# Patient Record
Sex: FEMALE | Race: BLACK OR AFRICAN AMERICAN | NOT HISPANIC OR LATINO | Employment: FULL TIME | URBAN - METROPOLITAN AREA
[De-identification: names, ages, dates, MRNs, and addresses within clinical notes are randomized per-mention and may not be internally consistent; named-entity substitution may affect disease eponyms.]

---

## 2023-02-05 PROBLEM — H26.9 CATARACTA: Status: ACTIVE | Noted: 2023-02-05

## 2023-02-05 PROBLEM — M94.0 COSTOCHONDRITIS: Status: ACTIVE | Noted: 2023-02-05

## 2023-02-05 PROBLEM — H52.209 MYOPIA WITH ASTIGMATISM AND PRESBYOPIA: Status: ACTIVE | Noted: 2023-02-05

## 2023-02-05 PROBLEM — R31.0 GROSS HEMATURIA: Status: ACTIVE | Noted: 2023-02-05

## 2023-02-05 PROBLEM — E11.9 DIABETES MELLITUS (MULTI): Status: RESOLVED | Noted: 2023-02-05 | Resolved: 2023-02-05

## 2023-02-05 PROBLEM — E66.812 CLASS 2 SEVERE OBESITY WITH BODY MASS INDEX (BMI) OF 35 TO 39.9 WITH SERIOUS COMORBIDITY: Status: ACTIVE | Noted: 2023-02-05

## 2023-02-05 PROBLEM — M25.50 ARTHRALGIA: Status: ACTIVE | Noted: 2023-02-05

## 2023-02-05 PROBLEM — H25.812 COMBINED FORM OF AGE-RELATED CATARACT, LEFT EYE: Status: ACTIVE | Noted: 2023-02-05

## 2023-02-05 PROBLEM — H25.811 COMBINED FORM OF AGE-RELATED CATARACT, RIGHT EYE: Status: ACTIVE | Noted: 2023-02-05

## 2023-02-05 PROBLEM — N39.0 ACUTE UTI: Status: RESOLVED | Noted: 2023-02-05 | Resolved: 2023-02-05

## 2023-02-05 PROBLEM — M43.10 ACQUIRED SPONDYLOLISTHESIS: Status: ACTIVE | Noted: 2023-02-05

## 2023-02-05 PROBLEM — Z96.1 PSEUDOPHAKIA OF RIGHT EYE: Status: ACTIVE | Noted: 2023-02-05

## 2023-02-05 PROBLEM — M72.2 PLANTAR FASCIITIS: Status: ACTIVE | Noted: 2023-02-05

## 2023-02-05 PROBLEM — N76.0 BACTERIAL VAGINOSIS: Status: RESOLVED | Noted: 2023-02-05 | Resolved: 2023-02-05

## 2023-02-05 PROBLEM — H35.411 LATTICE DEGENERATION OF PERIPHERAL RETINA, RIGHT: Status: ACTIVE | Noted: 2023-02-05

## 2023-02-05 PROBLEM — E11.9 CONTROLLED DIABETES MELLITUS TYPE II WITHOUT COMPLICATION (MULTI): Status: ACTIVE | Noted: 2023-02-05

## 2023-02-05 PROBLEM — G47.33 OSA (OBSTRUCTIVE SLEEP APNEA): Status: ACTIVE | Noted: 2023-02-05

## 2023-02-05 PROBLEM — H52.4 MYOPIA WITH ASTIGMATISM AND PRESBYOPIA: Status: ACTIVE | Noted: 2023-02-05

## 2023-02-05 PROBLEM — J01.00 ACUTE MAXILLARY SINUSITIS: Status: RESOLVED | Noted: 2023-02-05 | Resolved: 2023-02-05

## 2023-02-05 PROBLEM — E55.9 VITAMIN D DEFICIENCY: Status: ACTIVE | Noted: 2023-02-05

## 2023-02-05 PROBLEM — M54.16 LUMBAR RADICULOPATHY: Status: ACTIVE | Noted: 2023-02-05

## 2023-02-05 PROBLEM — H04.123 DRY EYES, BILATERAL: Status: ACTIVE | Noted: 2023-02-05

## 2023-02-05 PROBLEM — R06.83 SNORING: Status: ACTIVE | Noted: 2023-02-05

## 2023-02-05 PROBLEM — A59.01 TRICHOMONAS VAGINITIS: Status: RESOLVED | Noted: 2023-02-05 | Resolved: 2023-02-05

## 2023-02-05 PROBLEM — N89.8 VAGINA ITCHING: Status: RESOLVED | Noted: 2023-02-05 | Resolved: 2023-02-05

## 2023-02-05 PROBLEM — B96.89 BACTERIAL VAGINOSIS: Status: RESOLVED | Noted: 2023-02-05 | Resolved: 2023-02-05

## 2023-02-05 PROBLEM — R31.9 HEMATURIA: Status: ACTIVE | Noted: 2023-02-05

## 2023-02-05 PROBLEM — B37.31 VAGINAL YEAST INFECTION: Status: RESOLVED | Noted: 2023-02-05 | Resolved: 2023-02-05

## 2023-02-05 PROBLEM — E66.01 CLASS 2 SEVERE OBESITY WITH BODY MASS INDEX (BMI) OF 35 TO 39.9 WITH SERIOUS COMORBIDITY (MULTI): Status: ACTIVE | Noted: 2023-02-05

## 2023-02-05 PROBLEM — E78.5 HYPERLIPIDEMIA: Status: ACTIVE | Noted: 2023-02-05

## 2023-02-05 PROBLEM — R61 DIAPHORESIS: Status: ACTIVE | Noted: 2023-02-05

## 2023-02-05 PROBLEM — J30.9 ALLERGIC RHINITIS: Status: ACTIVE | Noted: 2023-02-05

## 2023-02-05 PROBLEM — R82.90 ABNORMAL FINDING IN URINE: Status: RESOLVED | Noted: 2023-02-05 | Resolved: 2023-02-05

## 2023-02-05 PROBLEM — H52.10 MYOPIA WITH ASTIGMATISM AND PRESBYOPIA: Status: ACTIVE | Noted: 2023-02-05

## 2023-02-05 PROBLEM — I10 HYPERTENSION: Status: ACTIVE | Noted: 2023-02-05

## 2023-02-05 PROBLEM — E66.9 OBESITY: Status: ACTIVE | Noted: 2023-02-05

## 2023-02-05 PROBLEM — E88.810 METABOLIC SYNDROME: Status: ACTIVE | Noted: 2023-02-05

## 2023-02-05 PROBLEM — H53.8 BLURRED VISION: Status: ACTIVE | Noted: 2023-02-05

## 2023-02-05 PROBLEM — J18.9 CAP (COMMUNITY ACQUIRED PNEUMONIA): Status: ACTIVE | Noted: 2023-02-05

## 2023-02-05 PROBLEM — R74.8 ELEVATED LIVER ENZYMES: Status: ACTIVE | Noted: 2023-02-05

## 2023-02-05 PROBLEM — G47.00 INSOMNIA: Status: ACTIVE | Noted: 2023-02-05

## 2023-02-05 PROBLEM — M54.50 LOW BACK PAIN: Status: ACTIVE | Noted: 2023-02-05

## 2023-02-05 RX ORDER — MONTELUKAST SODIUM 10 MG/1
TABLET ORAL
COMMUNITY
End: 2023-08-17 | Stop reason: SDDI

## 2023-02-05 RX ORDER — ALOGLIPTIN 25 MG/1
TABLET, FILM COATED ORAL
COMMUNITY
End: 2023-04-27

## 2023-02-05 RX ORDER — ESTRADIOL 0.1 MG/G
CREAM VAGINAL
COMMUNITY

## 2023-02-05 RX ORDER — SALIVA STIMULANT COMB. NO.3
SPRAY, NON-AEROSOL (ML) MUCOUS MEMBRANE
COMMUNITY

## 2023-02-05 RX ORDER — TALC
POWDER (GRAM) TOPICAL
COMMUNITY

## 2023-02-05 RX ORDER — GABAPENTIN 600 MG/1
TABLET ORAL
COMMUNITY
End: 2023-10-16 | Stop reason: SDUPTHER

## 2023-02-05 RX ORDER — CARVEDILOL 12.5 MG/1
TABLET ORAL
COMMUNITY
End: 2023-12-08 | Stop reason: SDUPTHER

## 2023-02-05 RX ORDER — MINERAL OIL
ENEMA (ML) RECTAL
COMMUNITY
End: 2024-05-13

## 2023-02-05 RX ORDER — IBUPROFEN 600 MG/1
TABLET ORAL
COMMUNITY
End: 2023-03-29 | Stop reason: SDUPTHER

## 2023-02-05 RX ORDER — SIMVASTATIN 40 MG/1
TABLET, FILM COATED ORAL
COMMUNITY
End: 2023-10-03 | Stop reason: SDUPTHER

## 2023-02-05 RX ORDER — POTASSIUM CHLORIDE 750 MG/1
TABLET, FILM COATED, EXTENDED RELEASE ORAL
COMMUNITY
End: 2023-09-01 | Stop reason: SDUPTHER

## 2023-02-05 RX ORDER — METFORMIN HYDROCHLORIDE 500 MG/1
500 TABLET, EXTENDED RELEASE ORAL
COMMUNITY

## 2023-02-05 RX ORDER — LOSARTAN POTASSIUM AND HYDROCHLOROTHIAZIDE 12.5; 1 MG/1; MG/1
TABLET ORAL
COMMUNITY
End: 2023-06-29

## 2023-02-05 RX ORDER — FLUTICASONE PROPIONATE 50 MCG
SPRAY, SUSPENSION (ML) NASAL
COMMUNITY
End: 2024-05-15

## 2023-02-15 RX ORDER — BLOOD SUGAR DIAGNOSTIC
STRIP MISCELLANEOUS
COMMUNITY
Start: 2021-08-23 | End: 2023-08-23

## 2023-03-29 DIAGNOSIS — J30.9 ALLERGIC RHINITIS, UNSPECIFIED SEASONALITY, UNSPECIFIED TRIGGER: ICD-10-CM

## 2023-03-30 RX ORDER — IBUPROFEN 600 MG/1
600 TABLET ORAL 3 TIMES DAILY
Qty: 90 TABLET | Refills: 0 | Status: SHIPPED | OUTPATIENT
Start: 2023-03-30 | End: 2023-04-30

## 2023-04-20 ENCOUNTER — OFFICE VISIT (OUTPATIENT)
Dept: PRIMARY CARE | Facility: CLINIC | Age: 63
End: 2023-04-20
Payer: MEDICAID

## 2023-04-20 VITALS
BODY MASS INDEX: 34.31 KG/M2 | HEIGHT: 64 IN | SYSTOLIC BLOOD PRESSURE: 119 MMHG | WEIGHT: 201 LBS | DIASTOLIC BLOOD PRESSURE: 88 MMHG

## 2023-04-20 DIAGNOSIS — E78.00 PURE HYPERCHOLESTEROLEMIA: ICD-10-CM

## 2023-04-20 DIAGNOSIS — Z12.11 SCREEN FOR COLON CANCER: ICD-10-CM

## 2023-04-20 DIAGNOSIS — E11.9 CONTROLLED TYPE 2 DIABETES MELLITUS WITHOUT COMPLICATION, WITHOUT LONG-TERM CURRENT USE OF INSULIN (MULTI): Primary | ICD-10-CM

## 2023-04-20 DIAGNOSIS — I10 PRIMARY HYPERTENSION: ICD-10-CM

## 2023-04-20 DIAGNOSIS — G47.33 OSA (OBSTRUCTIVE SLEEP APNEA): ICD-10-CM

## 2023-04-20 DIAGNOSIS — Z00.00 PREVENTATIVE HEALTH CARE: ICD-10-CM

## 2023-04-20 DIAGNOSIS — R21 RASH: ICD-10-CM

## 2023-04-20 PROCEDURE — 3074F SYST BP LT 130 MM HG: CPT | Performed by: FAMILY MEDICINE

## 2023-04-20 PROCEDURE — 1036F TOBACCO NON-USER: CPT | Performed by: FAMILY MEDICINE

## 2023-04-20 PROCEDURE — 3079F DIAST BP 80-89 MM HG: CPT | Performed by: FAMILY MEDICINE

## 2023-04-20 PROCEDURE — 99214 OFFICE O/P EST MOD 30 MIN: CPT | Performed by: FAMILY MEDICINE

## 2023-04-20 RX ORDER — CLOTRIMAZOLE AND BETAMETHASONE DIPROPIONATE 10; .64 MG/G; MG/G
1 CREAM TOPICAL 2 TIMES DAILY
Qty: 30 G | Refills: 0 | Status: SHIPPED | OUTPATIENT
Start: 2023-04-20 | End: 2023-05-20

## 2023-04-20 ASSESSMENT — PATIENT HEALTH QUESTIONNAIRE - PHQ9
SUM OF ALL RESPONSES TO PHQ9 QUESTIONS 1 AND 2: 0
1. LITTLE INTEREST OR PLEASURE IN DOING THINGS: NOT AT ALL
2. FEELING DOWN, DEPRESSED OR HOPELESS: NOT AT ALL

## 2023-04-22 PROBLEM — R21 RASH: Status: ACTIVE | Noted: 2023-04-22

## 2023-04-22 PROBLEM — Z12.11 SCREEN FOR COLON CANCER: Status: ACTIVE | Noted: 2023-04-22

## 2023-04-22 PROBLEM — Z00.00 PREVENTATIVE HEALTH CARE: Status: ACTIVE | Noted: 2023-04-22

## 2023-04-22 PROBLEM — E66.01 CLASS 2 SEVERE OBESITY WITH BODY MASS INDEX (BMI) OF 35 TO 39.9 WITH SERIOUS COMORBIDITY (MULTI): Status: RESOLVED | Noted: 2023-02-05 | Resolved: 2023-04-22

## 2023-04-22 PROBLEM — E66.812 CLASS 2 SEVERE OBESITY WITH BODY MASS INDEX (BMI) OF 35 TO 39.9 WITH SERIOUS COMORBIDITY: Status: RESOLVED | Noted: 2023-02-05 | Resolved: 2023-04-22

## 2023-04-22 ASSESSMENT — ENCOUNTER SYMPTOMS
ALLERGIC/IMMUNOLOGIC NEGATIVE: 1
GASTROINTESTINAL NEGATIVE: 1
NAUSEA: 0
HEMATOLOGIC/LYMPHATIC NEGATIVE: 1
MUSCULOSKELETAL NEGATIVE: 1
RESPIRATORY NEGATIVE: 1
FEVER: 0
ENDOCRINE NEGATIVE: 1
NEUROLOGICAL NEGATIVE: 1
CHILLS: 0
CARDIOVASCULAR NEGATIVE: 1
PSYCHIATRIC NEGATIVE: 1
VOMITING: 0

## 2023-04-22 NOTE — ASSESSMENT & PLAN NOTE
Check lipid panel continue medications continue healthy eating work out  150 minutes a week    
Education provided re: diabetes pathophysiology and management, including effectiveness of diet, exercise, and medication in controlling sugar and preventing complications, as well as information on the complications and recommended care. I want your fasting morning blood sugar to be less than 100.   I want your sugar to be less than 140 two hours after a meal.   
Patient's blood pressure is at goal of 130/85 or less. Condition is stable. Continue current medications and treatment plan.  I recommend that you exercise for 30-45 minutes 5 days a week.  I also recommend a balanced diet with fruits and vegetables every day, lean meats, and little fried foods. The DASH diet (you can find this online) is a good example of this.   
Prescription given for shingles vaccine patient is also due for colonoscopy  
No

## 2023-04-22 NOTE — PROGRESS NOTES
Subjective   Patient ID: Heaven Alfaro is a 62 y.o. female who presents for Follow-up (6 month) and Rash (Arm blotchy patches).      Here for follow-up of hypertension hyperlipidemia diabetes type 2 sees endocrinology diabetes being managed by endocrinology has been compliant with CPAP complaining of her rash that has been itchy thinks it was [place]    Rash  Pertinent negatives include no fever or vomiting.     Current Outpatient Medications on File Prior to Visit   Medication Sig Dispense Refill    alogliptin (Nesina) 25 mg tablet TAKE 1 TABLET BY MOUTH ONCE DAILY      carvedilol (Coreg) 12.5 mg tablet TAKE 1 TABLET TWICE DAILY WITH MEALS.      estradiol (Estrace) 0.01 % (0.1 mg/gram) vaginal cream APPLY A PEA-SIZED AMOUNT TO urethral and vagina EVERY EVENING FOR TWO WEEKS, THEN DECREASE TO EVERY MONDAY, WEDNESDAY AND FRIDAY THEREAFTER      fexofenadine (Allegra) 180 mg tablet TAKE 1 TABLET DAILY AS NEEDED      fluticasone (Flonase) 50 mcg/actuation nasal spray INSTILL 2 SPRAYS INTO EACH NOSTRIL EVERY DAY      gabapentin (Neurontin) 600 mg tablet TAKE 1 TABLET BY MOUTH THREE TIMES DAILY      ibuprofen 600 mg tablet Take 1 tablet (600 mg) by mouth in the morning and 1 tablet (600 mg) in the evening and 1 tablet (600 mg) before bedtime. 90 tablet 0    losartan-hydrochlorothiazide (Hyzaar) 100-12.5 mg tablet TAKE 1 TABLET DAILY.      melatonin 3 mg tablet TAKE 1 TABLET BY MOUTH AT BEDTIME AS NEEDED      metFORMIN XR (Glucophage-XR) 500 mg 24 hr tablet TAKE 4 TABLET Daily      moisturizing mouth (Biotene Moisturizing Mouth) solution USE AS DIRECTED AS A SALIVA SUBSTITUTE      montelukast (Singulair) 10 mg tablet TAKE 1 TABLET AT BEDTIME.      OneTouch Ultra Test strip USE 1 STRIP Daily      potassium chloride CR (Klor-Con) 10 mEq ER tablet TAKE 1 TABLET BY MOUTH DAILY      simvastatin (Zocor) 40 mg tablet TAKE ONE TABLET BY MOUTH EVERY DAY      [DISCONTINUED] empagliflozin  "(Jardiance) 10 mg Take 1 tablet (10 mg) by mouth once daily.       No current facility-administered medications on file prior to visit.        Review of Systems   Constitutional:  Negative for chills and fever.   HENT: Negative.     Respiratory: Negative.     Cardiovascular: Negative.    Gastrointestinal: Negative.  Negative for nausea and vomiting.   Endocrine: Negative.    Genitourinary: Negative.    Musculoskeletal: Negative.    Skin:  Positive for rash.   Allergic/Immunologic: Negative.    Neurological: Negative.    Hematological: Negative.    Psychiatric/Behavioral: Negative.     All other systems reviewed and are negative.      Objective   Blood Pressure 119/88   Height 1.626 m (5' 4\")   Weight 91.2 kg (201 lb)   Body Mass Index 34.50 kg/m²   BSA: 2.03 meters squared  Growth percentiles: Facility age limit for growth %cesia is 20 years. Facility age limit for growth %cesia is 20 years.   No visits with results within 1 Week(s) from this visit.   Latest known visit with results is:   Legacy Encounter on 03/07/2023   Component Date Value Ref Range Status    Ventricular Rate 03/07/2023 76  BPM Final    Atrial Rate 03/07/2023 76  BPM Final    UT Interval 03/07/2023 214  ms Final    QRS Duration 03/07/2023 82  ms Final    QT Interval 03/07/2023 378  ms Final    QTC Calculation(Bazett) 03/07/2023 425  ms Final    P Axis 03/07/2023 53  degrees Final    R Axis 03/07/2023 59  degrees Final    T Hominy 03/07/2023 53  degrees Final    QRS Count 03/07/2023 13  beats Final    Q Onset 03/07/2023 218  ms Final    P Onset 03/07/2023 111  ms Final    P Offset 03/07/2023 165  ms Final    T Offset 03/07/2023 407  ms Final    QTC Fredericia 03/07/2023 408  ms Final      Physical Exam  Vitals and nursing note reviewed.   Constitutional:       Appearance: Normal appearance.   HENT:      Head: Normocephalic and atraumatic.      Right Ear: Tympanic membrane normal.      Left Ear: Tympanic membrane normal.      Nose: Nose normal.      " Mouth/Throat:      Mouth: Mucous membranes are moist.   Eyes:      Pupils: Pupils are equal, round, and reactive to light.   Cardiovascular:      Rate and Rhythm: Normal rate and regular rhythm.      Pulses: Normal pulses.      Heart sounds: Normal heart sounds.   Pulmonary:      Effort: Pulmonary effort is normal.      Breath sounds: Normal breath sounds.   Abdominal:      General: Abdomen is flat. Bowel sounds are normal.      Palpations: Abdomen is soft.   Musculoskeletal:         General: Normal range of motion.      Cervical back: Normal range of motion and neck supple.   Skin:     General: Skin is warm and dry.      Capillary Refill: Capillary refill takes less than 2 seconds.      Findings: Rash present. Rash is macular.   Neurological:      General: No focal deficit present.      Mental Status: She is alert and oriented to person, place, and time.   Psychiatric:         Mood and Affect: Mood normal.         Assessment/Plan   Problem List Items Addressed This Visit       Controlled diabetes mellitus type II without complication (CMS/HCC) - Primary     Education provided re: diabetes pathophysiology and management, including effectiveness of diet, exercise, and medication in controlling sugar and preventing complications, as well as information on the complications and recommended care. I want your fasting morning blood sugar to be less than 100.   I want your sugar to be less than 140 two hours after a meal.          Hyperlipidemia     Check lipid panel continue medications continue healthy eating work out  150 minutes a week           Hypertension     Patient's blood pressure is at goal of 130/85 or less. Condition is stable. Continue current medications and treatment plan.  I recommend that you exercise for 30-45 minutes 5 days a week.  I also recommend a balanced diet with fruits and vegetables every day, lean meats, and little fried foods. The DASH diet (you can find this online) is a good example of  this.          MOLLY (obstructive sleep apnea)    Rash    Relevant Medications    clotrimazole-betamethasone (Lotrisone) cream    Preventative health care     Prescription given for shingles vaccine patient is also due for colonoscopy

## 2023-04-27 DIAGNOSIS — E11.9 CONTROLLED TYPE 2 DIABETES MELLITUS WITHOUT COMPLICATION, WITHOUT LONG-TERM CURRENT USE OF INSULIN (MULTI): ICD-10-CM

## 2023-04-27 RX ORDER — ALOGLIPTIN 25 MG/1
25 TABLET, FILM COATED ORAL DAILY
Qty: 90 TABLET | Refills: 1 | Status: SHIPPED | OUTPATIENT
Start: 2023-04-27 | End: 2023-09-01 | Stop reason: SDUPTHER

## 2023-04-30 DIAGNOSIS — J30.9 ALLERGIC RHINITIS, UNSPECIFIED SEASONALITY, UNSPECIFIED TRIGGER: ICD-10-CM

## 2023-04-30 RX ORDER — IBUPROFEN 600 MG/1
TABLET ORAL
Qty: 90 TABLET | Refills: 0 | Status: SHIPPED | OUTPATIENT
Start: 2023-04-30 | End: 2023-06-22 | Stop reason: SDUPTHER

## 2023-06-22 DIAGNOSIS — J30.9 ALLERGIC RHINITIS, UNSPECIFIED SEASONALITY, UNSPECIFIED TRIGGER: ICD-10-CM

## 2023-06-22 RX ORDER — IBUPROFEN 600 MG/1
TABLET ORAL
Qty: 90 TABLET | Refills: 1 | Status: SHIPPED | OUTPATIENT
Start: 2023-06-22 | End: 2023-09-12

## 2023-06-29 ENCOUNTER — TELEPHONE (OUTPATIENT)
Dept: PRIMARY CARE | Facility: CLINIC | Age: 63
End: 2023-06-29
Payer: MEDICAID

## 2023-06-29 DIAGNOSIS — I10 PRIMARY HYPERTENSION: ICD-10-CM

## 2023-06-29 RX ORDER — LOSARTAN POTASSIUM AND HYDROCHLOROTHIAZIDE 12.5; 1 MG/1; MG/1
1 TABLET ORAL DAILY
Qty: 90 TABLET | Refills: 1 | Status: SHIPPED | OUTPATIENT
Start: 2023-06-29 | End: 2023-07-23

## 2023-06-29 NOTE — TELEPHONE ENCOUNTER
Pt says yes she needs a letter to clear her to work due to her bp  The emial letter to [email or web address]

## 2023-07-23 DIAGNOSIS — I10 PRIMARY HYPERTENSION: ICD-10-CM

## 2023-07-23 RX ORDER — LOSARTAN POTASSIUM AND HYDROCHLOROTHIAZIDE 12.5; 1 MG/1; MG/1
1 TABLET ORAL DAILY
Qty: 90 TABLET | Refills: 1 | Status: SHIPPED | OUTPATIENT
Start: 2023-07-23 | End: 2023-07-24 | Stop reason: SDUPTHER

## 2023-07-24 DIAGNOSIS — I10 PRIMARY HYPERTENSION: ICD-10-CM

## 2023-07-24 RX ORDER — LOSARTAN POTASSIUM AND HYDROCHLOROTHIAZIDE 12.5; 1 MG/1; MG/1
1 TABLET ORAL DAILY
Qty: 90 TABLET | Refills: 1 | Status: SHIPPED | OUTPATIENT
Start: 2023-07-24 | End: 2023-07-25 | Stop reason: SDUPTHER

## 2023-07-25 DIAGNOSIS — I10 PRIMARY HYPERTENSION: ICD-10-CM

## 2023-07-25 RX ORDER — LOSARTAN POTASSIUM AND HYDROCHLOROTHIAZIDE 12.5; 1 MG/1; MG/1
1 TABLET ORAL DAILY
Qty: 90 TABLET | Refills: 1 | Status: SHIPPED | OUTPATIENT
Start: 2023-07-25

## 2023-08-13 DIAGNOSIS — E11.9 CONTROLLED TYPE 2 DIABETES MELLITUS WITHOUT COMPLICATION, WITHOUT LONG-TERM CURRENT USE OF INSULIN (MULTI): ICD-10-CM

## 2023-08-13 RX ORDER — BLOOD-GLUCOSE CONTROL, NORMAL
1 EACH MISCELLANEOUS 3 TIMES DAILY
Qty: 100 EACH | Refills: 3 | Status: SHIPPED | OUTPATIENT
Start: 2023-08-13 | End: 2023-09-27 | Stop reason: ALTCHOICE

## 2023-08-17 ENCOUNTER — OFFICE VISIT (OUTPATIENT)
Dept: PRIMARY CARE | Facility: CLINIC | Age: 63
End: 2023-08-17
Payer: MEDICAID

## 2023-08-17 VITALS
HEART RATE: 65 BPM | SYSTOLIC BLOOD PRESSURE: 119 MMHG | WEIGHT: 200 LBS | DIASTOLIC BLOOD PRESSURE: 78 MMHG | BODY MASS INDEX: 34.15 KG/M2 | HEIGHT: 64 IN

## 2023-08-17 DIAGNOSIS — E78.00 PURE HYPERCHOLESTEROLEMIA: ICD-10-CM

## 2023-08-17 DIAGNOSIS — E11.9 CONTROLLED TYPE 2 DIABETES MELLITUS WITHOUT COMPLICATION, WITHOUT LONG-TERM CURRENT USE OF INSULIN (MULTI): Primary | ICD-10-CM

## 2023-08-17 DIAGNOSIS — Z12.31 VISIT FOR SCREENING MAMMOGRAM: ICD-10-CM

## 2023-08-17 DIAGNOSIS — Z00.00 ROUTINE GENERAL MEDICAL EXAMINATION AT A HEALTH CARE FACILITY: ICD-10-CM

## 2023-08-17 DIAGNOSIS — E55.9 VITAMIN D DEFICIENCY: ICD-10-CM

## 2023-08-17 DIAGNOSIS — Z12.11 COLON CANCER SCREENING: ICD-10-CM

## 2023-08-17 DIAGNOSIS — I10 PRIMARY HYPERTENSION: ICD-10-CM

## 2023-08-17 LAB
ALANINE AMINOTRANSFERASE (SGPT) (U/L) IN SER/PLAS: 22 U/L (ref 7–45)
ALBUMIN (G/DL) IN SER/PLAS: 4.3 G/DL (ref 3.4–5)
ALBUMIN (MG/L) IN URINE: 7.2 MG/L
ALBUMIN/CREATININE (UG/MG) IN URINE: 6.9 UG/MG CRT (ref 0–30)
ALKALINE PHOSPHATASE (U/L) IN SER/PLAS: 55 U/L (ref 33–136)
ANION GAP IN SER/PLAS: 15 MMOL/L (ref 10–20)
ASPARTATE AMINOTRANSFERASE (SGOT) (U/L) IN SER/PLAS: 29 U/L (ref 9–39)
BILIRUBIN TOTAL (MG/DL) IN SER/PLAS: 0.8 MG/DL (ref 0–1.2)
CALCIDIOL (25 OH VITAMIN D3) (NG/ML) IN SER/PLAS: 37 NG/ML
CALCIUM (MG/DL) IN SER/PLAS: 9.3 MG/DL (ref 8.6–10.6)
CARBON DIOXIDE, TOTAL (MMOL/L) IN SER/PLAS: 23 MMOL/L (ref 21–32)
CHLORIDE (MMOL/L) IN SER/PLAS: 109 MMOL/L (ref 98–107)
CHOLESTEROL (MG/DL) IN SER/PLAS: 139 MG/DL (ref 0–199)
CHOLESTEROL IN HDL (MG/DL) IN SER/PLAS: 36 MG/DL
CHOLESTEROL/HDL RATIO: 3.9
COBALAMIN (VITAMIN B12) (PG/ML) IN SER/PLAS: >2000 PG/ML (ref 211–911)
CREATININE (MG/DL) IN SER/PLAS: 0.81 MG/DL (ref 0.5–1.05)
CREATININE (MG/DL) IN URINE: 105 MG/DL (ref 20–320)
ERYTHROCYTE DISTRIBUTION WIDTH (RATIO) BY AUTOMATED COUNT: 13.5 % (ref 11.5–14.5)
ERYTHROCYTE MEAN CORPUSCULAR HEMOGLOBIN CONCENTRATION (G/DL) BY AUTOMATED: 31 G/DL (ref 32–36)
ERYTHROCYTE MEAN CORPUSCULAR VOLUME (FL) BY AUTOMATED COUNT: 91 FL (ref 80–100)
ERYTHROCYTES (10*6/UL) IN BLOOD BY AUTOMATED COUNT: 4.4 X10E12/L (ref 4–5.2)
GFR FEMALE: 82 ML/MIN/1.73M2
GLUCOSE (MG/DL) IN SER/PLAS: 102 MG/DL (ref 74–99)
HEMATOCRIT (%) IN BLOOD BY AUTOMATED COUNT: 40 % (ref 36–46)
HEMOGLOBIN (G/DL) IN BLOOD: 12.4 G/DL (ref 12–16)
LDL: 81 MG/DL (ref 0–99)
LEUKOCYTES (10*3/UL) IN BLOOD BY AUTOMATED COUNT: 5.8 X10E9/L (ref 4.4–11.3)
NRBC (PER 100 WBCS) BY AUTOMATED COUNT: 0 /100 WBC (ref 0–0)
PLATELETS (10*3/UL) IN BLOOD AUTOMATED COUNT: 290 X10E9/L (ref 150–450)
POC HEMOGLOBIN A1C: 6.7 % (ref 4.2–6.5)
POTASSIUM (MMOL/L) IN SER/PLAS: 3.5 MMOL/L (ref 3.5–5.3)
PROTEIN TOTAL: 7.1 G/DL (ref 6.4–8.2)
SODIUM (MMOL/L) IN SER/PLAS: 143 MMOL/L (ref 136–145)
THYROTROPIN (MIU/L) IN SER/PLAS BY DETECTION LIMIT <= 0.05 MIU/L: 0.77 MIU/L (ref 0.44–3.98)
TRIGLYCERIDE (MG/DL) IN SER/PLAS: 111 MG/DL (ref 0–149)
UREA NITROGEN (MG/DL) IN SER/PLAS: 20 MG/DL (ref 6–23)
VLDL: 22 MG/DL (ref 0–40)

## 2023-08-17 PROCEDURE — 3074F SYST BP LT 130 MM HG: CPT | Performed by: FAMILY MEDICINE

## 2023-08-17 PROCEDURE — 82607 VITAMIN B-12: CPT

## 2023-08-17 PROCEDURE — 99214 OFFICE O/P EST MOD 30 MIN: CPT | Performed by: FAMILY MEDICINE

## 2023-08-17 PROCEDURE — 82306 VITAMIN D 25 HYDROXY: CPT

## 2023-08-17 PROCEDURE — 80053 COMPREHEN METABOLIC PANEL: CPT

## 2023-08-17 PROCEDURE — 82570 ASSAY OF URINE CREATININE: CPT

## 2023-08-17 PROCEDURE — 3078F DIAST BP <80 MM HG: CPT | Performed by: FAMILY MEDICINE

## 2023-08-17 PROCEDURE — 80061 LIPID PANEL: CPT

## 2023-08-17 PROCEDURE — 84443 ASSAY THYROID STIM HORMONE: CPT

## 2023-08-17 PROCEDURE — 85027 COMPLETE CBC AUTOMATED: CPT

## 2023-08-17 PROCEDURE — 83036 HEMOGLOBIN GLYCOSYLATED A1C: CPT | Performed by: FAMILY MEDICINE

## 2023-08-17 PROCEDURE — 82043 UR ALBUMIN QUANTITATIVE: CPT

## 2023-08-17 PROCEDURE — 1036F TOBACCO NON-USER: CPT | Performed by: FAMILY MEDICINE

## 2023-08-17 RX ORDER — BLOOD-GLUCOSE CONTROL, NORMAL
EACH MISCELLANEOUS
Qty: 100 EACH | Refills: 3 | Status: SHIPPED | OUTPATIENT
Start: 2023-08-17 | End: 2023-09-28 | Stop reason: ALTCHOICE

## 2023-08-17 RX ORDER — PNEUMOCOCCAL 20-VALENT CONJUGATE VACCINE 2.2; 2.2; 2.2; 2.2; 2.2; 2.2; 2.2; 2.2; 2.2; 2.2; 2.2; 2.2; 2.2; 2.2; 2.2; 2.2; 4.4; 2.2; 2.2; 2.2 UG/.5ML; UG/.5ML; UG/.5ML; UG/.5ML; UG/.5ML; UG/.5ML; UG/.5ML; UG/.5ML; UG/.5ML; UG/.5ML; UG/.5ML; UG/.5ML; UG/.5ML; UG/.5ML; UG/.5ML; UG/.5ML; UG/.5ML; UG/.5ML; UG/.5ML; UG/.5ML
0.5 INJECTION, SUSPENSION INTRAMUSCULAR ONCE
Qty: 0.5 ML | Refills: 0 | Status: SHIPPED | OUTPATIENT
Start: 2023-08-17 | End: 2023-08-17

## 2023-08-17 ASSESSMENT — ENCOUNTER SYMPTOMS
DEPRESSION: 0
OCCASIONAL FEELINGS OF UNSTEADINESS: 0
LOSS OF SENSATION IN FEET: 0

## 2023-08-17 ASSESSMENT — ACTIVITIES OF DAILY LIVING (ADL)
GROCERY_SHOPPING: INDEPENDENT
TAKING_MEDICATION: INDEPENDENT
DOING_HOUSEWORK: INDEPENDENT
MANAGING_FINANCES: INDEPENDENT
DRESSING: INDEPENDENT
BATHING: INDEPENDENT

## 2023-08-17 ASSESSMENT — PATIENT HEALTH QUESTIONNAIRE - PHQ9
1. LITTLE INTEREST OR PLEASURE IN DOING THINGS: NOT AT ALL
SUM OF ALL RESPONSES TO PHQ9 QUESTIONS 1 AND 2: 0
2. FEELING DOWN, DEPRESSED OR HOPELESS: NOT AT ALL

## 2023-08-19 PROBLEM — Z12.11 COLON CANCER SCREENING: Status: ACTIVE | Noted: 2023-08-19

## 2023-08-19 PROBLEM — Z12.31 VISIT FOR SCREENING MAMMOGRAM: Status: ACTIVE | Noted: 2023-08-19

## 2023-08-19 PROBLEM — Z00.00 ROUTINE GENERAL MEDICAL EXAMINATION AT A HEALTH CARE FACILITY: Status: ACTIVE | Noted: 2023-08-19

## 2023-08-19 NOTE — PROGRESS NOTES
Assessment and Plan:  Problem List Items Addressed This Visit       Combined form of age-related cataract, right eye    Controlled diabetes mellitus type II without complication (CMS/HCC)    Relevant Medications    lancets (OneTouch Delica Plus Lancet) 30 gauge misc    Other Relevant Orders    Comprehensive Metabolic Panel (Completed)    CBC (Completed)    Vitamin B12 (Completed)    Albumin , Urine Random (Completed)    POCT glycosylated hemoglobin (Hb A1C) manually resulted (Completed)    Hyperlipidemia    Relevant Orders    TSH with reflex to Free T4 if abnormal (Completed)    Lipid Panel (Completed)    CBC (Completed)    Vitamin B12 (Completed)    Hypertension    Relevant Orders    TSH with reflex to Free T4 if abnormal (Completed)    Comprehensive Metabolic Panel (Completed)    CBC (Completed)    Vitamin D deficiency    Relevant Orders    Vitamin D, Total (Completed)    Comprehensive Metabolic Panel (Completed)    CBC (Completed)    Vitamin B12 (Completed)    Routine general medical examination at a health care facility - Primary    Relevant Orders    Comprehensive Metabolic Panel (Completed)    CBC (Completed)    Visit for screening mammogram    Relevant Orders    BI mammo bilateral screening tomosynthesis    Comprehensive Metabolic Panel (Completed)    CBC (Completed)    Colon cancer screening    Relevant Orders    Cologuard® colon cancer screening         HPI:   Patient is here for follow-up denies any complaints A1c is 6.7  Blood pressures controlled    Does not have time to do colonoscopy will do Cologuard  Due for mammogram  ROS   Constitutional:  o weight loss. Negative for chills and fever.   HENT: Negative.     Respiratory: Negative.     Cardiovascular: Negative.    Gastrointestinal: Negative.  Negative for nausea and vomiting.   Endocrine: Negative.    Genitourinary: Negative.    Musculoskeletal: Negative.    Skin: Negative.  Negative for rash.   Allergic/Immunologic: Negative.    Neurological:  "Negative.    Hematological: Negative.    Psychiatric/Behavioral: Negative.     All other systems reviewed and are negative.      Blood Pressure 119/78   Pulse 65   Height 1.626 m (5' 4\")   Weight 90.7 kg (200 lb)   Body Mass Index 34.33 kg/m²   Body mass index is 34.33 kg/m².  Physical Exam    ENT:      Head: Normocephalic and atraumatic.      Right Ear: Tympanic membrane normal.      Left Ear: Tympanic membrane normal.      Nose: Nose normal.      Mouth/Throat:      Mouth: Mucous membranes are moist.   Eyes:      Pupils: Pupils are equal, round, and reactive to light.   Cardiovascular:      Rate and Rhythm: Normal rate and regular rhythm.      Pulses: Normal pulses.      Heart sounds: Normal heart sounds.   Pulmonary:      Effort: Pulmonary effort is normal.      Breath sounds: Normal breath sounds.   Abdominal:      General: Abdomen is flat. Bowel sounds are normal.      Palpations: Abdomen is soft.   Musculoskeletal:         General: Normal range of motion.      Cervical back: Normal range of motion and neck supple.   Skin:     General: Skin is warm and dry.      Capillary Refill: Capillary refill takes less than 2 seconds.   Neurological:      General: No focal deficit present.      Mental Status: She is alert and oriented to person, place, and time.   Psychiatric:         Mood and Affect: Mood normal.       Active Problem List  Patient Active Problem List   Diagnosis    Acquired spondylolisthesis    Allergic rhinitis    Arthralgia    Blurred vision    CAP (community acquired pneumonia)    Cataracta    Obesity    Combined form of age-related cataract, left eye    Combined form of age-related cataract, right eye    Controlled diabetes mellitus type II without complication (CMS/HCC)    Costochondritis    Lumbar radiculopathy    Diaphoresis    Dry eyes, bilateral    Elevated liver enzymes    Gross hematuria    Hematuria    Hyperlipidemia    Hypertension    Insomnia    Lattice degeneration of peripheral retina, " right    Low back pain    Metabolic syndrome    Myopia with astigmatism and presbyopia    MOLLY (obstructive sleep apnea)    Plantar fasciitis    Pseudophakia of right eye    Snoring    Vitamin D deficiency    Rash    Preventative health care    Routine general medical examination at a health care facility    Visit for screening mammogram    Colon cancer screening       Comprehensive Medical/Surgical/Social/Family History  Past Medical History:   Diagnosis Date    Abnormal finding in urine 02/05/2023    Acute UTI 02/05/2023    Bacterial vaginosis 02/05/2023    Electrolyte imbalance 06/27/2012    Other conditions influencing health status     Normal coronary arteries    Vaginal yeast infection 02/05/2023     Past Surgical History:   Procedure Laterality Date    HYSTERECTOMY      OTHER SURGICAL HISTORY  05/04/2018    Radical Total Abdominal Hysterectomy     Social History     Social History Narrative    Not on file       Allergies and Medications  Chocolate flavor, Percocet [oxycodone-acetaminophen], and Propoxyphene n-acetaminophen  Current Outpatient Medications   Medication Instructions    alogliptin (NESINA) 25 mg, oral, Daily    carvedilol (Coreg) 12.5 mg tablet TAKE 1 TABLET TWICE DAILY WITH MEALS.    estradiol (Estrace) 0.01 % (0.1 mg/gram) vaginal cream APPLY A PEA-SIZED AMOUNT TO urethral and vagina EVERY EVENING FOR TWO WEEKS, THEN DECREASE TO EVERY MONDAY, WEDNESDAY AND FRIDAY THEREAFTER    fexofenadine (Allegra) 180 mg tablet TAKE 1 TABLET DAILY AS NEEDED    fluticasone (Flonase) 50 mcg/actuation nasal spray INSTILL 2 SPRAYS INTO EACH NOSTRIL EVERY DAY    gabapentin (Neurontin) 600 mg tablet TAKE 1 TABLET BY MOUTH THREE TIMES DAILY    ibuprofen 600 mg tablet TAKE 1 TABLET(600 MG) BY MOUTH EVERY MORNING AND IN THE EVENING AND AT BEDTIME    lancets (OneTouch Delica Plus Lancet) 30 gauge misc Check BG tid    lancets 30 gauge misc 1 each, miscellaneous, 3 times daily    losartan-hydrochlorothiazide (Hyzaar)  100-12.5 mg tablet 1 tablet, oral, Daily    melatonin 3 mg tablet TAKE 1 TABLET BY MOUTH AT BEDTIME AS NEEDED    metFORMIN XR (Glucophage-XR) 500 mg 24 hr tablet TAKE 4 TABLET Daily    moisturizing mouth (Biotene Moisturizing Mouth) solution USE AS DIRECTED AS A SALIVA SUBSTITUTE    OneTouch Ultra Test strip USE 1 STRIP Daily    potassium chloride CR (Klor-Con) 10 mEq ER tablet TAKE 1 TABLET BY MOUTH DAILY    simvastatin (Zocor) 40 mg tablet TAKE ONE TABLET BY MOUTH EVERY DAY

## 2023-08-23 DIAGNOSIS — E11.9 CONTROLLED TYPE 2 DIABETES MELLITUS WITHOUT COMPLICATION, WITHOUT LONG-TERM CURRENT USE OF INSULIN (MULTI): ICD-10-CM

## 2023-08-23 RX ORDER — BLOOD SUGAR DIAGNOSTIC
STRIP MISCELLANEOUS
Qty: 100 STRIP | Refills: 1 | Status: SHIPPED | OUTPATIENT
Start: 2023-08-23 | End: 2023-12-05

## 2023-08-31 LAB — NONINV COLON CA DNA+OCC BLD SCRN STL QL: NORMAL

## 2023-09-01 DIAGNOSIS — E11.9 CONTROLLED TYPE 2 DIABETES MELLITUS WITHOUT COMPLICATION, WITHOUT LONG-TERM CURRENT USE OF INSULIN (MULTI): ICD-10-CM

## 2023-09-01 DIAGNOSIS — E87.6 LOW BLOOD POTASSIUM: ICD-10-CM

## 2023-09-01 RX ORDER — POTASSIUM CHLORIDE 750 MG/1
10 TABLET, FILM COATED, EXTENDED RELEASE ORAL DAILY
Qty: 90 TABLET | Refills: 1 | Status: SHIPPED | OUTPATIENT
Start: 2023-09-01 | End: 2023-12-27

## 2023-09-01 RX ORDER — ALOGLIPTIN 25 MG/1
25 TABLET, FILM COATED ORAL DAILY
Qty: 90 TABLET | Refills: 1 | Status: SHIPPED | OUTPATIENT
Start: 2023-09-01 | End: 2024-04-22 | Stop reason: SDUPTHER

## 2023-09-11 ENCOUNTER — TELEPHONE (OUTPATIENT)
Dept: PRIMARY CARE | Facility: CLINIC | Age: 63
End: 2023-09-11
Payer: MEDICAID

## 2023-09-12 DIAGNOSIS — J30.9 ALLERGIC RHINITIS, UNSPECIFIED SEASONALITY, UNSPECIFIED TRIGGER: ICD-10-CM

## 2023-09-12 RX ORDER — IBUPROFEN 600 MG/1
TABLET ORAL
Qty: 90 TABLET | Refills: 1 | Status: SHIPPED | OUTPATIENT
Start: 2023-09-12 | End: 2023-12-03 | Stop reason: SDUPTHER

## 2023-09-26 LAB — NONINV COLON CA DNA+OCC BLD SCRN STL QL: NEGATIVE

## 2023-09-27 ENCOUNTER — TELEPHONE (OUTPATIENT)
Dept: PRIMARY CARE | Facility: CLINIC | Age: 63
End: 2023-09-27
Payer: MEDICAID

## 2023-09-28 ENCOUNTER — APPOINTMENT (OUTPATIENT)
Dept: PRIMARY CARE | Facility: CLINIC | Age: 63
End: 2023-09-28
Payer: MEDICAID

## 2023-09-28 ENCOUNTER — OFFICE VISIT (OUTPATIENT)
Dept: PRIMARY CARE | Facility: CLINIC | Age: 63
End: 2023-09-28
Payer: MEDICAID

## 2023-09-28 VITALS
HEIGHT: 64 IN | DIASTOLIC BLOOD PRESSURE: 76 MMHG | WEIGHT: 196 LBS | HEART RATE: 64 BPM | SYSTOLIC BLOOD PRESSURE: 115 MMHG | BODY MASS INDEX: 33.46 KG/M2

## 2023-09-28 DIAGNOSIS — J20.9 ACUTE BRONCHITIS, UNSPECIFIED ORGANISM: Primary | ICD-10-CM

## 2023-09-28 PROCEDURE — 99214 OFFICE O/P EST MOD 30 MIN: CPT | Performed by: FAMILY MEDICINE

## 2023-09-28 PROCEDURE — 1036F TOBACCO NON-USER: CPT | Performed by: FAMILY MEDICINE

## 2023-09-28 PROCEDURE — 3078F DIAST BP <80 MM HG: CPT | Performed by: FAMILY MEDICINE

## 2023-09-28 PROCEDURE — 87635 SARS-COV-2 COVID-19 AMP PRB: CPT

## 2023-09-28 PROCEDURE — 3074F SYST BP LT 130 MM HG: CPT | Performed by: FAMILY MEDICINE

## 2023-09-28 RX ORDER — CEFUROXIME AXETIL 500 MG/1
500 TABLET ORAL 2 TIMES DAILY
Qty: 20 TABLET | Refills: 0 | Status: SHIPPED | OUTPATIENT
Start: 2023-09-28 | End: 2023-10-08

## 2023-09-28 RX ORDER — BENZONATATE 200 MG/1
200 CAPSULE ORAL 3 TIMES DAILY PRN
Qty: 42 CAPSULE | Refills: 0 | Status: SHIPPED | OUTPATIENT
Start: 2023-09-28 | End: 2023-10-28

## 2023-09-28 RX ORDER — PREDNISONE 20 MG/1
20 TABLET ORAL 2 TIMES DAILY
Qty: 14 TABLET | Refills: 0 | Status: SHIPPED | OUTPATIENT
Start: 2023-09-28 | End: 2023-10-05

## 2023-09-28 RX ORDER — ALBUTEROL SULFATE 90 UG/1
2 AEROSOL, METERED RESPIRATORY (INHALATION) EVERY 4 HOURS PRN
Qty: 8 G | Refills: 11 | Status: SHIPPED | OUTPATIENT
Start: 2023-09-28 | End: 2024-09-27

## 2023-09-28 ASSESSMENT — ENCOUNTER SYMPTOMS
OCCASIONAL FEELINGS OF UNSTEADINESS: 0
DEPRESSION: 0
LOSS OF SENSATION IN FEET: 0

## 2023-09-29 LAB — SARS-COV-2 RESULT: NOT DETECTED

## 2023-10-01 PROBLEM — J20.9 ACUTE BRONCHITIS: Status: ACTIVE | Noted: 2023-10-01

## 2023-10-01 NOTE — PROGRESS NOTES
"Assessment and Plan:  Problem List Items Addressed This Visit       Acute bronchitis - Primary    Relevant Medications    cefuroxime (Ceftin) 500 mg tablet    albuterol (Ventolin HFA) 90 mcg/actuation inhaler    predniSONE (Deltasone) 20 mg tablet    benzonatate (Tessalon) 200 mg capsule    Other Relevant Orders    Sars-CoV-2 PCR, Symptomatic (Completed)         HPI:  Acute sinusitis c/o headache fever facial pain cough ear pain cough green phlem. No ST .         ROS   Constitutional:  o weight loss. Negative for chills and fever.   HENT: Negative.     Respiratory: Negative.     Cardiovascular: Negative.    Gastrointestinal: Negative.  Negative for nausea and vomiting.   Endocrine: Negative.    Genitourinary: Negative.    Musculoskeletal: Negative.    Skin: Negative.  Negative for rash.   Allergic/Immunologic: Negative.    Neurological: Negative.    Hematological: Negative.    Psychiatric/Behavioral: Negative.     All other systems reviewed and are negative.      Blood Pressure 115/76   Pulse 64   Height 1.626 m (5' 4\")   Weight 88.9 kg (196 lb)   Body Mass Index 33.64 kg/m²   Body mass index is 33.64 kg/m².  Physical Exam    ENT:      Head: Normocephalic and atraumatic.      Right Ear: Tympanic membrane normal.      Left Ear: Tympanic membrane normal.      Nose: Nose normal.      Mouth/Throat:      Mouth: Mucous membranes are moist.   Eyes:      Pupils: Pupils are equal, round, and reactive to light.   Cardiovascular:      Rate and Rhythm: Normal rate and regular rhythm.      Pulses: Normal pulses.      Heart sounds: Normal heart sounds.   Pulmonary:      Effort: Pulmonary effort is normal.      Breath sounds: Normal breath sounds.   Abdominal:      General: Abdomen is flat. Bowel sounds are normal.      Palpations: Abdomen is soft.   Musculoskeletal:         General: Normal range of motion.      Cervical back: Normal range of motion and neck supple.   Skin:     General: Skin is warm and dry.      Capillary " Refill: Capillary refill takes less than 2 seconds.   Neurological:      General: No focal deficit present.      Mental Status: She is alert and oriented to person, place, and time.   Psychiatric:         Mood and Affect: Mood normal.       Active Problem List  Patient Active Problem List   Diagnosis    Acquired spondylolisthesis    Allergic rhinitis    Arthralgia    Blurred vision    CAP (community acquired pneumonia)    Cataracta    Obesity    Combined form of age-related cataract, left eye    Combined form of age-related cataract, right eye    Controlled diabetes mellitus type II without complication (CMS/HCC)    Costochondritis    Lumbar radiculopathy    Diaphoresis    Dry eyes, bilateral    Elevated liver enzymes    Gross hematuria    Hematuria    Hyperlipidemia    Hypertension    Insomnia    Lattice degeneration of peripheral retina, right    Low back pain    Metabolic syndrome    Myopia with astigmatism and presbyopia    MOLLY (obstructive sleep apnea)    Plantar fasciitis    Pseudophakia of right eye    Snoring    Vitamin D deficiency    Rash    Preventative health care    Routine general medical examination at a health care facility    Visit for screening mammogram    Colon cancer screening    Acute bronchitis       Comprehensive Medical/Surgical/Social/Family History  Past Medical History:   Diagnosis Date    Abnormal finding in urine 02/05/2023    Acute UTI 02/05/2023    Bacterial vaginosis 02/05/2023    Electrolyte imbalance 06/27/2012    Other conditions influencing health status     Normal coronary arteries    Vaginal yeast infection 02/05/2023     Past Surgical History:   Procedure Laterality Date    HYSTERECTOMY      OTHER SURGICAL HISTORY  05/04/2018    Radical Total Abdominal Hysterectomy     Social History     Social History Narrative    Not on file       Allergies and Medications  Chocolate flavor, Oxycodone-acetaminophen, and Propoxyphene n-acetaminophen  Current Outpatient Medications    Medication Instructions    albuterol (Ventolin HFA) 90 mcg/actuation inhaler 2 puffs, inhalation, Every 4 hours PRN    alogliptin (NESINA) 25 mg, oral, Daily    benzonatate (TESSALON) 200 mg, oral, 3 times daily PRN, Do not crush or chew.    carvedilol (Coreg) 12.5 mg tablet TAKE 1 TABLET TWICE DAILY WITH MEALS.    cefuroxime (CEFTIN) 500 mg, oral, 2 times daily    estradiol (Estrace) 0.01 % (0.1 mg/gram) vaginal cream APPLY A PEA-SIZED AMOUNT TO urethral and vagina EVERY EVENING FOR TWO WEEKS, THEN DECREASE TO EVERY MONDAY, WEDNESDAY AND FRIDAY THEREAFTER    fexofenadine (Allegra) 180 mg tablet TAKE 1 TABLET DAILY AS NEEDED    fluticasone (Flonase) 50 mcg/actuation nasal spray INSTILL 2 SPRAYS INTO EACH NOSTRIL EVERY DAY    gabapentin (Neurontin) 600 mg tablet TAKE 1 TABLET BY MOUTH THREE TIMES DAILY    ibuprofen 600 mg tablet TAKE 1 TABLET BY MOUTH EVERY MORNING, EVERY EVENING, AND EVERY NIGHT AT BEDTIME    losartan-hydrochlorothiazide (Hyzaar) 100-12.5 mg tablet 1 tablet, oral, Daily    melatonin 3 mg tablet TAKE 1 TABLET BY MOUTH AT BEDTIME AS NEEDED    metFORMIN XR (Glucophage-XR) 500 mg 24 hr tablet TAKE 4 TABLET Daily    moisturizing mouth (Biotene Moisturizing Mouth) solution USE AS DIRECTED AS A SALIVA SUBSTITUTE    OneTouch Ultra Test strip USE 1 STRIP DAILY TO TEST BLOOD SUGAR    potassium chloride CR (Klor-Con) 10 mEq ER tablet 10 mEq, oral, Daily, Do not crush, chew, or split.    predniSONE (DELTASONE) 20 mg, oral, 2 times daily    simvastatin (Zocor) 40 mg tablet TAKE ONE TABLET BY MOUTH EVERY DAY

## 2023-10-03 DIAGNOSIS — E11.9 CONTROLLED TYPE 2 DIABETES MELLITUS WITHOUT COMPLICATION, UNSPECIFIED WHETHER LONG TERM INSULIN USE (MULTI): Primary | ICD-10-CM

## 2023-10-03 RX ORDER — SIMVASTATIN 40 MG/1
40 TABLET, FILM COATED ORAL DAILY
Qty: 90 TABLET | Refills: 2 | Status: SHIPPED | OUTPATIENT
Start: 2023-10-03 | End: 2024-04-14

## 2023-10-16 DIAGNOSIS — M43.10 ACQUIRED SPONDYLOLISTHESIS: Primary | ICD-10-CM

## 2023-10-16 RX ORDER — GABAPENTIN 600 MG/1
600 TABLET ORAL 3 TIMES DAILY
Qty: 90 TABLET | Refills: 0 | Status: SHIPPED | OUTPATIENT
Start: 2023-10-16 | End: 2023-11-25 | Stop reason: SDUPTHER

## 2023-11-16 ENCOUNTER — OFFICE VISIT (OUTPATIENT)
Dept: PRIMARY CARE | Facility: CLINIC | Age: 63
End: 2023-11-16
Payer: MEDICAID

## 2023-11-16 VITALS
DIASTOLIC BLOOD PRESSURE: 80 MMHG | SYSTOLIC BLOOD PRESSURE: 145 MMHG | BODY MASS INDEX: 34.33 KG/M2 | WEIGHT: 200 LBS | HEART RATE: 69 BPM

## 2023-11-16 DIAGNOSIS — I10 PRIMARY HYPERTENSION: ICD-10-CM

## 2023-11-16 DIAGNOSIS — Z23 INFLUENZA VACCINE ADMINISTERED: ICD-10-CM

## 2023-11-16 DIAGNOSIS — E11.9 CONTROLLED TYPE 2 DIABETES MELLITUS WITHOUT COMPLICATION, WITHOUT LONG-TERM CURRENT USE OF INSULIN (MULTI): ICD-10-CM

## 2023-11-16 DIAGNOSIS — E78.00 PURE HYPERCHOLESTEROLEMIA: ICD-10-CM

## 2023-11-16 DIAGNOSIS — Z00.00 ROUTINE GENERAL MEDICAL EXAMINATION AT A HEALTH CARE FACILITY: Primary | ICD-10-CM

## 2023-11-16 LAB — POC HEMOGLOBIN A1C: 6.9 % (ref 4.2–6.5)

## 2023-11-16 PROCEDURE — 3077F SYST BP >= 140 MM HG: CPT | Performed by: FAMILY MEDICINE

## 2023-11-16 PROCEDURE — 99396 PREV VISIT EST AGE 40-64: CPT | Performed by: FAMILY MEDICINE

## 2023-11-16 PROCEDURE — 90471 IMMUNIZATION ADMIN: CPT | Performed by: FAMILY MEDICINE

## 2023-11-16 PROCEDURE — 1036F TOBACCO NON-USER: CPT | Performed by: FAMILY MEDICINE

## 2023-11-16 PROCEDURE — 90686 IIV4 VACC NO PRSV 0.5 ML IM: CPT | Performed by: FAMILY MEDICINE

## 2023-11-16 PROCEDURE — 99214 OFFICE O/P EST MOD 30 MIN: CPT | Performed by: FAMILY MEDICINE

## 2023-11-16 PROCEDURE — 83036 HEMOGLOBIN GLYCOSYLATED A1C: CPT | Performed by: FAMILY MEDICINE

## 2023-11-16 PROCEDURE — 3079F DIAST BP 80-89 MM HG: CPT | Performed by: FAMILY MEDICINE

## 2023-11-16 ASSESSMENT — ENCOUNTER SYMPTOMS
CONFUSION: 0
LOSS OF SENSATION IN FEET: 0
SWEATS: 1
BLURRED VISION: 0
WEAKNESS: 0
OCCASIONAL FEELINGS OF UNSTEADINESS: 0
SPEECH DIFFICULTY: 0
TREMORS: 0
HUNGER: 0
DEPRESSION: 0
HEADACHES: 0
NERVOUS/ANXIOUS: 0
DIZZINESS: 0
SEIZURES: 0
POLYPHAGIA: 0
FATIGUE: 0
WEIGHT LOSS: 0
VISUAL CHANGE: 0
POLYDIPSIA: 0
BLACKOUTS: 0

## 2023-11-16 NOTE — PROGRESS NOTES
Answers submitted by the patient for this visit:  Diabetes Questionnaire (Submitted on 11/16/2023)  Chief Complaint: Diabetes problem  Diabetes type: type 2  MedicAlert ID: No  Disease duration: 5 Years  blurred vision: No  chest pain: No  fatigue: No  foot paresthesias: No  foot ulcerations: No  polydipsia: No  polyphagia: No  polyuria: No  visual change: No  weakness: No  weight loss: No  Symptom course: stable  confusion: No  dizziness: No  headaches: No  hunger: No  mood changes: No  nervous/anxious: No  pallor: No  seizures: No  sleepiness: Yes  speech difficulty: No  sweats: Yes  tremors: No  blackouts: No  hospitalization: No  nocturnal hypoglycemia: No  required assistance: No  required glucagon: No  CVA: No  heart disease: No  impotence: No  nephropathy: No  peripheral neuropathy: No  PVD: No  retinopathy: No  CAD risks: family history, hypertension, obesity  Current treatments: oral agent (dual therapy)  Treatment compliance: most of the time  Home blood tests: 1-2 x per day  Monitoring compliance: adequate  Blood glucose trend: fluctuating minimally  breakfast time: 6-7 am  breakfast glucose level: 110-130  lunch time: 11-12 pm  lunch glucose level: 110-130  dinner time: 7-8 pm  dinner glucose level: 110-130  High score: 110-130  Overall: 110-130  Weight trend: decreasing steadily  Current diet: generally healthy  Meal planning: none  Exercise: weekly  Dietitian visit: No  Eye exam current: Yes  Sees podiatrist: No    Subjective     Patient ID: Heaven Alfaro is a 63 y.o. female who presents for Follow-up (3month ).      Last Physical : 1 Years ago     Pt's PMH, PSH, SH, FH , meds and allergies was obtained / reviewed and updated .     Dental visits : Y  Vision issues : N  Hearing issues : N    Immunizations : Y    Diet :  could be better  Exercise:  Weight concerns :     Alcohol: as noted in SH  Tobacco: as noted in SH  Recreational drug use : None/ as noted in SH    Sexually active : Active    Contraception :   Menstrual problems:  Premenopausal/perimenopausal/ postmenopausal:    G:  Parity:  Full term:    Premature:   (s):   Living :  Ab induced:   Ab spontaneous :  Ectopic :   Multiple :    PAP smear :  Mammogram :  Colonoscopy:    Metabolic screening   - Lipid   - Glucose    Here for follow-up of diabetes A1c is stable denies chest pain shortness of breath up-to-date with the eye exams  Taking simvastatin now myalgias  Denies dizziness or edema  No numbness tingling in the feet  ======================================================    Visit Vitals  Blood Pressure 145/80   Pulse 69   Weight 90.7 kg (200 lb)   Body Mass Index 34.33 kg/m²   Smoking Status Never   Body Surface Area 2.02 m²      No LMP recorded.     =====================================    Review of systems:  Constitutional: no chills, no fever and no night sweats.     Eyes: no blurred vision and no eyesight problems.     ENT: no hearing loss, no nasal congestion, no nasal discharge, no hoarseness and no sore throat.     Cardiovascular: no chest pain, no intermittent leg claudication, no lower extremity edema, no palpitations and no syncope.     Respiratory: no cough, no shortness of breath during exertion, no shortness of breath at rest and no wheezing.     Gastrointestinal: no abdominal pain, no blood in stools, no constipation, no diarrhea, no melena, no nausea, no rectal pain and no vomiting.     Genitourinary: no dysuria, no change in urinary frequency, no urinary hesitancy, no feelings of urinary urgency and no vaginal discharge.     Musculoskeletal: no arthralgias, no back pain and no myalgias.     Integumentary: no new skin lesions and no rashes.     Neurological: no difficulty walking, no headache, no limb weakness, no numbness and no tingling.     Psychiatric: no anxiety, no depression, no anhedonia and no substance use disorders.     Endocrine: no recent weight gain and no recent weight loss.     Hematologic/Lymphatic:  no tendency for easy bruising and no swollen glands.   ============================================================    Physical exam :    Constitutional: Alert and in no acute distress. Well developed, well nourished.     Eyes: Normal external exam. Pupils were equal in size, round, reactive to light (PERRL) with normal accommodation and extraocular movements intact (EOMI).     Ears, Nose, Mouth, and Throat: External inspection of ears and nose: Normal.  Otoscopic examination: Normal.      Neck: No neck mass was observed. Supple.     Cardiovascular: Heart rate and rhythm were normal, normal S1 and S2, no gallops, no murmurs and no pericardial rub    Pulmonary: No respiratory distress. Clear bilateral breath sounds.     Abdomen: Soft nontender; no abdominal mass palpated. No organomegaly.     Musculoskeletal: No joint swelling seen, normal movements of all extremities. Range of motion: Normal.  Muscle strength/tone: Normal.      Skin: Normal skin color and pigmentation, normal skin turgor, and no rash.     Neurologic: Deep tendon reflexes were 2+ and symmetric. Sensation: Normal.     Psychiatric: Judgment and insight: Intact. Mood and affect: Normal.    Lymphatic : Cervical/ axillary/ groin Lns Palpable/ non palpable            All other systems have been reviewed and are negative for complaint.      Assessment/Plan    Problem List Items Addressed This Visit             ICD-10-CM    Controlled diabetes mellitus type II without complication (CMS/[facility]) E11.9     Education provided re: diabetes pathophysiology and management, including effectiveness of diet, exercise, and medication in controlling sugar and preventing complications, as well as information on the complications and recommended care. I want your fasting morning blood sugar to be less than 100.   I want your sugar to be less than 140 two hours after a meal.          Relevant Orders    POCT glycosylated hemoglobin (Hb A1C) manually resulted (Completed)     Hyperlipidemia E78.5     Check lipid panel continue medications continue healthy eating work out  150 minutes a week           Hypertension I10     Patient's blood pressure is at goal of 130/85 or less. Condition is stable. Continue current medications and treatment plan.  I recommend that you exercise for 30-45 minutes 5 days a week.  I also recommend a balanced diet with fruits and vegetables every day, lean meats, and little fried foods. The DASH diet (you can find this online) is a good example of this.          Routine general medical examination at a health care facility - Primary Z00.00    Influenza vaccine administered Z23    Relevant Orders    Flu vaccine (IIV4) age 6 months and greater, preservative free (Completed)

## 2023-11-18 PROBLEM — Z23 INFLUENZA VACCINE ADMINISTERED: Status: ACTIVE | Noted: 2023-11-18

## 2023-11-18 NOTE — ASSESSMENT & PLAN NOTE
Education provided re: diabetes pathophysiology and management, including effectiveness of diet, exercise, and medication in controlling sugar and preventing complications, as well as information on the complications and recommended care. I want your fasting morning blood sugar to be less than 100.   I want your sugar to be less than 140 two hours after a meal.

## 2023-11-25 DIAGNOSIS — M43.10 ACQUIRED SPONDYLOLISTHESIS: ICD-10-CM

## 2023-11-25 RX ORDER — GABAPENTIN 600 MG/1
600 TABLET ORAL 3 TIMES DAILY
Qty: 90 TABLET | Refills: 3 | Status: SHIPPED | OUTPATIENT
Start: 2023-11-25 | End: 2023-11-27 | Stop reason: SDUPTHER

## 2023-11-27 DIAGNOSIS — M43.10 ACQUIRED SPONDYLOLISTHESIS: ICD-10-CM

## 2023-11-27 RX ORDER — GABAPENTIN 600 MG/1
600 TABLET ORAL 3 TIMES DAILY
Qty: 90 TABLET | Refills: 0 | Status: SHIPPED | OUTPATIENT
Start: 2023-11-27 | End: 2024-06-03

## 2023-12-01 ENCOUNTER — ANCILLARY PROCEDURE (OUTPATIENT)
Dept: RADIOLOGY | Facility: CLINIC | Age: 63
End: 2023-12-01
Payer: MEDICAID

## 2023-12-01 VITALS — BODY MASS INDEX: 34.14 KG/M2 | WEIGHT: 199.96 LBS | HEIGHT: 64 IN

## 2023-12-01 DIAGNOSIS — Z12.31 VISIT FOR SCREENING MAMMOGRAM: ICD-10-CM

## 2023-12-01 PROCEDURE — 77067 SCR MAMMO BI INCL CAD: CPT

## 2023-12-01 PROCEDURE — 77063 BREAST TOMOSYNTHESIS BI: CPT | Mod: BILATERAL PROCEDURE | Performed by: RADIOLOGY

## 2023-12-01 PROCEDURE — 77067 SCR MAMMO BI INCL CAD: CPT | Mod: BILATERAL PROCEDURE | Performed by: RADIOLOGY

## 2023-12-03 DIAGNOSIS — J30.9 ALLERGIC RHINITIS, UNSPECIFIED SEASONALITY, UNSPECIFIED TRIGGER: ICD-10-CM

## 2023-12-03 RX ORDER — IBUPROFEN 600 MG/1
TABLET ORAL
Qty: 90 TABLET | Refills: 1 | Status: SHIPPED | OUTPATIENT
Start: 2023-12-03 | End: 2024-03-10

## 2023-12-05 DIAGNOSIS — E11.9 CONTROLLED TYPE 2 DIABETES MELLITUS WITHOUT COMPLICATION, WITHOUT LONG-TERM CURRENT USE OF INSULIN (MULTI): ICD-10-CM

## 2023-12-05 RX ORDER — BLOOD SUGAR DIAGNOSTIC
STRIP MISCELLANEOUS
Qty: 100 STRIP | Refills: 1 | Status: SHIPPED | OUTPATIENT
Start: 2023-12-05

## 2023-12-08 DIAGNOSIS — I10 HYPERTENSION: Primary | ICD-10-CM

## 2023-12-08 RX ORDER — CARVEDILOL 12.5 MG/1
12.5 TABLET ORAL
Qty: 180 TABLET | Refills: 3 | Status: SHIPPED | OUTPATIENT
Start: 2023-12-08 | End: 2023-12-29 | Stop reason: SDUPTHER

## 2023-12-27 DIAGNOSIS — E87.6 LOW BLOOD POTASSIUM: ICD-10-CM

## 2023-12-27 RX ORDER — POTASSIUM CHLORIDE 750 MG/1
10 TABLET, EXTENDED RELEASE ORAL DAILY
Qty: 30 TABLET | Refills: 0 | Status: SHIPPED | OUTPATIENT
Start: 2023-12-27 | End: 2024-01-23

## 2023-12-29 DIAGNOSIS — I10 HYPERTENSION: ICD-10-CM

## 2023-12-29 RX ORDER — CARVEDILOL 12.5 MG/1
12.5 TABLET ORAL
Qty: 180 TABLET | Refills: 0 | Status: SHIPPED | OUTPATIENT
Start: 2023-12-29 | End: 2024-12-28

## 2024-01-05 ENCOUNTER — OFFICE VISIT (OUTPATIENT)
Dept: PAIN MEDICINE | Facility: HOSPITAL | Age: 64
End: 2024-01-05
Payer: MEDICAID

## 2024-01-05 DIAGNOSIS — M54.16 LUMBAR RADICULOPATHY: Primary | ICD-10-CM

## 2024-01-05 PROCEDURE — 1036F TOBACCO NON-USER: CPT | Performed by: ANESTHESIOLOGY

## 2024-01-05 PROCEDURE — 99213 OFFICE O/P EST LOW 20 MIN: CPT | Performed by: ANESTHESIOLOGY

## 2024-01-05 ASSESSMENT — PAIN SCALES - GENERAL: PAINLEVEL: 6

## 2024-01-10 NOTE — PROGRESS NOTES
Chief Complaint   Patient presents with    Back Pain    Leg Pain      HPI   Ms Alfaro is a 63-year-old female with a history of back and leg symptoms.  The patient had a fall back in June.  She says she thinks that did aggravate the pain to some degree.  She is currently working at [employer] in [facility].  She previously was seen for a lumbar epidural steroid injection at L4-5.  The last injection specifically was done on 12/9/2022 and she reports that the epidural injection on that date gave her 80 to 85% relief for 1 year.  The patient has been keeping up with an exercise program and does stretches a few times a week.  The patient notes that they have worsened pain with the lifting and bending which can be aggravated with work.  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  In terms of medications she has had gabapentin, ibuprofen, Tylenol for pain control.    13 point review of systems is complete is negative listed above in HPI.    Gen.: Patient appears to be stated age, fair hygiene  Eyes: Pupils are symmetric, conjunctiva is nonicteric and lids without obvious drooping or rash  ENT: Hearing is grossly intact, external ears and nose appear to be without deformity or rash. No lesions or masses noted.  Neck: No JVD noted, tracheal position is midline  Respiratory: No gasping or shortness of breath noted, no use of accessory muscles noted  Cardiovascular: Extremity show no edema or varicosities  Lymph: No lymphadenopathy noted in the anterior cervical regions bilaterally  Skin no rashes or open lesions or ulcers identified on the skin  Musculoskeletal: Positive SLR bilaterally, intact strength, normal reflex  Neurologic: Cranial nerves II through XII are grossly intact  Psychiatric:  Patient is alert and oriented x3    Impression/plan: 63-year-old female with a history of low back and leg pains who had 80 to 85% relief of her low back and leg symptoms following an intralaminar epidural at L4-5.  Patient  has tried medication management, formal physical therapy and keeps up with ongoing physician directed exercises several times a week.    -Will plan for a repeat lumbar epidural at L4-5.  Procedure, risk, benefits, alternatives have been reviewed.  If we do not see the same relief we will get an updated MRI of the lumbar spine for more advanced procedure planning versus surgical referral.

## 2024-01-23 DIAGNOSIS — E87.6 LOW BLOOD POTASSIUM: ICD-10-CM

## 2024-01-23 RX ORDER — POTASSIUM CHLORIDE 750 MG/1
10 TABLET, EXTENDED RELEASE ORAL DAILY
Qty: 90 TABLET | Refills: 0 | Status: SHIPPED | OUTPATIENT
Start: 2024-01-23 | End: 2024-03-10

## 2024-01-26 ENCOUNTER — HOSPITAL ENCOUNTER (OUTPATIENT)
Dept: RADIOLOGY | Facility: HOSPITAL | Age: 64
Discharge: HOME | End: 2024-01-26
Payer: MEDICAID

## 2024-01-26 VITALS
TEMPERATURE: 97.9 F | BODY MASS INDEX: 33.8 KG/M2 | DIASTOLIC BLOOD PRESSURE: 72 MMHG | WEIGHT: 198 LBS | HEART RATE: 62 BPM | SYSTOLIC BLOOD PRESSURE: 136 MMHG | OXYGEN SATURATION: 99 % | RESPIRATION RATE: 16 BRPM | HEIGHT: 64 IN

## 2024-01-26 DIAGNOSIS — M54.16 LUMBAR RADICULOPATHY: ICD-10-CM

## 2024-01-26 PROCEDURE — 77003 FLUOROGUIDE FOR SPINE INJECT: CPT

## 2024-01-26 PROCEDURE — 64483 NJX AA&/STRD TFRM EPI L/S 1: CPT | Mod: 50

## 2024-01-26 PROCEDURE — 64483 NJX AA&/STRD TFRM EPI L/S 1: CPT | Performed by: ANESTHESIOLOGY

## 2024-01-26 ASSESSMENT — PAIN SCALES - GENERAL
PAINLEVEL_OUTOF10: 5 - MODERATE PAIN
PAINLEVEL_OUTOF10: 6
PAINLEVEL_OUTOF10: 0 - NO PAIN

## 2024-01-26 ASSESSMENT — PAIN - FUNCTIONAL ASSESSMENT
PAIN_FUNCTIONAL_ASSESSMENT: WONG-BAKER FACES
PAIN_FUNCTIONAL_ASSESSMENT: WONG-BAKER FACES
PAIN_FUNCTIONAL_ASSESSMENT: 0-10
PAIN_FUNCTIONAL_ASSESSMENT: 0-10
PAIN_FUNCTIONAL_ASSESSMENT: WONG-BAKER FACES

## 2024-01-26 NOTE — H&P
History Of Present Illness  Heaven Alfaro is a 63 y.o. female presents for procedure state below. Endorses no changes in past medical history or medical health since last seen in clinic.     Past Medical History  She has a past medical history of Abnormal finding in urine (02/05/2023), Acute UTI (02/05/2023), Bacterial vaginosis (02/05/2023), Electrolyte imbalance (06/27/2012), Other conditions influencing health status, and Vaginal yeast infection (02/05/2023).    Surgical History  She has a past surgical history that includes Hysterectomy and Other surgical history (05/04/2018).     Social History  She reports that she has never smoked. She has never used smokeless tobacco. She reports current alcohol use. She reports that she does not use drugs.    Family History  Family History   Problem Relation Name Age of Onset    Hypertension Mother      Glaucoma Mother      Breast cancer Mother      Breast cancer Mother's Sister          Allergies  Chocolate flavor, Oxycodone-acetaminophen, and Propoxyphene n-acetaminophen    Review of Symptoms:   Constitutional: Negative for chills, diaphoresis or fever  HENT: Negative for neck swelling  Eyes:.  Negative for eye pain  Respiratory:.  Negative for cough, shortness of breath or wheezing    Cardiovascular:.  Negative for chest pain or palpitations  Gastrointestinal:.  Negative for abdominal pain, nausea and vomiting  Genitourinary:.  Negative for urgency  Musculoskeletal:  Positive for back pain. Positive for joint pain. Denies falls within the past 3 months.  Skin: Negative for wounds or itching   Neurological: Negative for dizziness, seizures, loss of consciousness and weakness  Endo/Heme/Allergies: Does not bruise/bleed easily  Psychiatric/Behavioral: Negative for depression. The patient does not appear anxious.       PHYSICAL EXAM  Vitals signs reviewed  Constitutional:       General: Not in acute distress     Appearance: Normal appearance. Not ill-appearing.  HENT:      Head: Normocephalic and atraumatic  Eyes:     Conjunctiva/sclera: Conjunctivae normal  Cardiovascular:     Rate and Rhythm: Normal rate and regular rhythm  Pulmonary:     Effort: No respiratory distress  Abdominal:     Palpations: Abdomen is soft  Musculoskeletal: SMITH  Skin:     General: Skin is warm and dry  Neurological:     General: No focal deficit present  Psychiatric:         Mood and Affect: Mood normal         Behavior: Behavior normal     Last Recorded Vitals  /73   Pulse 79   Temp 36.2 °C (97.2 °F) (Skin)   Resp 16   Wt 89.8 kg (198 lb)   SpO2 100%     Relevant Results  Current Outpatient Medications   Medication Instructions    albuterol (Ventolin HFA) 90 mcg/actuation inhaler 2 puffs, inhalation, Every 4 hours PRN    alogliptin (NESINA) 25 mg, oral, Daily    carvedilol (COREG) 12.5 mg, oral, 2 times daily with meals    estradiol (Estrace) 0.01 % (0.1 mg/gram) vaginal cream APPLY A PEA-SIZED AMOUNT TO urethral and vagina EVERY EVENING FOR TWO WEEKS, THEN DECREASE TO EVERY MONDAY, WEDNESDAY AND FRIDAY THEREAFTER    fexofenadine (Allegra) 180 mg tablet TAKE 1 TABLET DAILY AS NEEDED    fluticasone (Flonase) 50 mcg/actuation nasal spray INSTILL 2 SPRAYS INTO EACH NOSTRIL EVERY DAY    gabapentin (NEURONTIN) 600 mg, oral, 3 times daily    ibuprofen 600 mg tablet TAKE 1 TABLET BY MOUTH EVERY MORNING, EVERY EVENING, AND EVERY NIGHT AT BEDTIME    losartan-hydrochlorothiazide (Hyzaar) 100-12.5 mg tablet 1 tablet, oral, Daily    melatonin 3 mg tablet TAKE 1 TABLET BY MOUTH AT BEDTIME AS NEEDED    metFORMIN XR (Glucophage-XR) 500 mg 24 hr tablet TAKE 4 TABLET Daily    moisturizing mouth (Biotene Moisturizing Mouth) solution USE AS DIRECTED AS A SALIVA SUBSTITUTE    OneTouch Ultra Test strip USE 1 STRIP DAILY TO TEST BLOOD SUGAR    potassium chloride CR 10 mEq ER tablet 10 mEq, oral, Daily    simvastatin (ZOCOR) 40 mg, oral, Daily       No results found for this or any previous visit from the past 1000  days.     No image results found.       1. Lumbar radiculopathy  FL pain management TC    FL pain management TC    Epidural Steroid Injection    Epidural Steroid Injection           ASSESSMENT/PLAN  Heaven Alfaro is a 63 y.o. female presents for an L4-5 interlaminar epidural steroid injection    Our plan is as follows:  - Follow In pain clinic  - Continue to participate in physical therapy as well as physician directed home exercises  - Continue pain medications as prescribed       Andrey Howard MD

## 2024-01-26 NOTE — PROCEDURES
Preoperative diagnosis:  Lumbar radiculitis  Postoperative diagnosis:  Lumbar radiculitis, spondylolisthesis, lumbar spinal stenosis  Procedure: Interlaminar L4-5 converted to a bilateral L4 lumbar transforaminal epidural steroid injection under fluoroscopic guidance  Surgeon: Isabelle Campbell  Assistant:  Fellow, Andrey Howard  Anesthesia: Local   Complications: Apparently none    Clinical note: Ms. Alfaro is a 63-year-old female with a history of back and leg pain who has had prior intralaminar epidurals with longstanding relief.  She presents today for the aforementioned procedure.    Procedure note: The patient was met in the preoperative holding area after risks benefits and alternatives to procedure were discussed with the patient, informed consent was obtained. Patient brought back to the procedure room and placed in the prone position on the fluoroscopy table. Area over the back was exposed, prepped, draped, in the usual sterile fashion.  Initially and approach to localize the skin in place Touhy needle was done at the interlaminar space at L4-5 however she had a significant listhesis and the needle was unable to be advanced into the epidural space despite changing trajectory.      At that point the patient was reprepped using chlorhexidine.  Neuroforamen identified.  Skin and subcutaneous tissues to the neuroforamen was anesthetized using 0.5% lidocaine.  22-gauge Sprotte needles were inserted in the skin and advanced into the foramen. Needle tip position was confirmed in AP oblique and lateral view.  Contrast was injected which showed appropriate epidural spread, no intravascular or intrathecal uptake. A total of 2 mL of 0.5% lidocaine mixed with 10 mg dexamethasone was injected in divided doses among the 2 needles. Needles removed, bandage applied, patient tolerated the procedure reasonably well with no immediate complications.    I discussed with the patient that likely she has had progression of her  wear-and-tear changes as we previously accessed the epidural space without any difficulty.  Would plan for an updated MRI if she does not see relief with this injection.

## 2024-02-16 ENCOUNTER — OFFICE VISIT (OUTPATIENT)
Dept: CARDIOLOGY | Facility: HOSPITAL | Age: 64
End: 2024-02-16
Payer: MEDICAID

## 2024-02-16 ENCOUNTER — HOSPITAL ENCOUNTER (OUTPATIENT)
Dept: CARDIOLOGY | Facility: HOSPITAL | Age: 64
Discharge: HOME | End: 2024-02-16
Payer: MEDICAID

## 2024-02-16 VITALS
DIASTOLIC BLOOD PRESSURE: 72 MMHG | HEIGHT: 64 IN | BODY MASS INDEX: 33.77 KG/M2 | SYSTOLIC BLOOD PRESSURE: 119 MMHG | WEIGHT: 197.8 LBS | HEART RATE: 68 BPM

## 2024-02-16 DIAGNOSIS — I10 HYPERTENSION, UNSPECIFIED TYPE: Primary | ICD-10-CM

## 2024-02-16 DIAGNOSIS — E78.00 PURE HYPERCHOLESTEROLEMIA: ICD-10-CM

## 2024-02-16 PROCEDURE — 99213 OFFICE O/P EST LOW 20 MIN: CPT | Performed by: NURSE PRACTITIONER

## 2024-02-16 PROCEDURE — 93010 ELECTROCARDIOGRAM REPORT: CPT | Performed by: INTERNAL MEDICINE

## 2024-02-16 PROCEDURE — 93005 ELECTROCARDIOGRAM TRACING: CPT

## 2024-02-16 PROCEDURE — 3078F DIAST BP <80 MM HG: CPT | Performed by: NURSE PRACTITIONER

## 2024-02-16 PROCEDURE — 1036F TOBACCO NON-USER: CPT | Performed by: NURSE PRACTITIONER

## 2024-02-16 PROCEDURE — 3074F SYST BP LT 130 MM HG: CPT | Performed by: NURSE PRACTITIONER

## 2024-02-16 ASSESSMENT — ENCOUNTER SYMPTOMS: HYPERTENSION: 1

## 2024-02-16 NOTE — PROGRESS NOTES
Subjective   Heaven Alfaro is a 63 y.o. female.    Chief Complaint:  Hyperlipidemia and Hypertension    Mrs. Alfaro returns for her annual follow up. She has been feeling well from a cardiac standpoint. She has remained compliant with her medications, denying any intolerances. She struggles with back pain, though denies any chest pain or shortness of breath at her current activity level. She denies any recent ER visits or hospitalizations. She offers no specific cardiovascular complaints or concerns today. She denies any complaints of chest pain, shortness of breath, lightheadedness, dizziness, palpitations, syncope, orthopnea, paroxysmal nocturnal dyspnea, lower extremity swelling or bleeding concerns.      Hyperlipidemia    Hypertension      Review of Systems   All other systems reviewed and are negative.      Objective   Physical Exam  Constitutional:       Appearance: Healthy appearance. In no distress  Pulmonary:      Effort: Pulmonary effort is normal.      Breath sounds: Normal breath sounds.   Cardiovascular:      Normal rate. Regular rhythm. Normal S1. Normal S2.       Murmurs: There is no murmur.      Carotids: right carotid pulse +2, no bruit heard over the right carotid. left carotid pulse +2, no bruit heard over the left carotid.  Edema:     Peripheral edema absent.   Abdominal:      Palpations: Abdomen is soft.   Musculoskeletal:       Cervical back: Normal range of motion.   Skin:     General: Skin is warm and dry. Normal color and pigmentation   Neurological:      Mental Status: Alert and oriented to person, place and time.   Psychiatric:     Mood and Affect: appropriate mood and appropriate affect.     EKG obtained and reviewed. Sinus rhythm with 1st degree AV block. HR 68    Lab Review:   Lab Results   Component Value Date     08/17/2023    K 3.5 08/17/2023     (H) 08/17/2023    CO2 23 08/17/2023    BUN 20 08/17/2023    CREATININE 0.81 08/17/2023    GLUCOSE 102 (H) 08/17/2023    CALCIUM  9.3 08/17/2023     Lab Results   Component Value Date    WBC 5.8 08/17/2023    HGB 12.4 08/17/2023    HCT 40.0 08/17/2023    MCV 91 08/17/2023     08/17/2023     Lab Results   Component Value Date    CHOL 139 08/17/2023    TRIG 111 08/17/2023    HDL 36.0 (A) 08/17/2023    LDLDIRECT 121 09/29/2022       Assessment/Plan   Mrs. Alfaro is a pleasant 63 year old  female with a past medical history significant for hypertension, hyperlipidemia, MOLLY compliant with CPAP and diabetes. She presents today for routine follow up stable from a cardiac standpoint. Her VS and EKG remain stable. She remains on appropriate risk factor reduction therapy. She seems to be getting around reasonably well other than back pain. I will have her continue all medications unchanged. She was encouraged to continue to remain active. She will follow up with us in clinic in one year. She knows to call for any concerns.

## 2024-02-20 LAB
ATRIAL RATE: 68 BPM
P AXIS: 6 DEGREES
P OFFSET: 154 MS
P ONSET: 112 MS
PR INTERVAL: 214 MS
Q ONSET: 219 MS
QRS COUNT: 11 BEATS
QRS DURATION: 82 MS
QT INTERVAL: 406 MS
QTC CALCULATION(BAZETT): 431 MS
QTC FREDERICIA: 423 MS
R AXIS: 50 DEGREES
T AXIS: 40 DEGREES
T OFFSET: 422 MS
VENTRICULAR RATE: 68 BPM

## 2024-02-22 ENCOUNTER — CLINICAL SUPPORT (OUTPATIENT)
Dept: PRIMARY CARE | Facility: CLINIC | Age: 64
End: 2024-02-22
Payer: MEDICAID

## 2024-02-22 VITALS
BODY MASS INDEX: 33.8 KG/M2 | WEIGHT: 198 LBS | DIASTOLIC BLOOD PRESSURE: 87 MMHG | SYSTOLIC BLOOD PRESSURE: 129 MMHG | HEIGHT: 64 IN | HEART RATE: 71 BPM

## 2024-02-22 DIAGNOSIS — E11.9 CONTROLLED TYPE 2 DIABETES MELLITUS WITHOUT COMPLICATION, WITHOUT LONG-TERM CURRENT USE OF INSULIN (MULTI): ICD-10-CM

## 2024-02-22 LAB — POC HEMOGLOBIN A1C: 7 % (ref 4.2–6.5)

## 2024-02-22 PROCEDURE — 83036 HEMOGLOBIN GLYCOSYLATED A1C: CPT | Performed by: FAMILY MEDICINE

## 2024-02-22 ASSESSMENT — PATIENT HEALTH QUESTIONNAIRE - PHQ9
1. LITTLE INTEREST OR PLEASURE IN DOING THINGS: NOT AT ALL
2. FEELING DOWN, DEPRESSED OR HOPELESS: NOT AT ALL
SUM OF ALL RESPONSES TO PHQ9 QUESTIONS 1 AND 2: 0

## 2024-02-22 ASSESSMENT — ENCOUNTER SYMPTOMS
LOSS OF SENSATION IN FEET: 0
OCCASIONAL FEELINGS OF UNSTEADINESS: 0
DEPRESSION: 0

## 2024-03-10 DIAGNOSIS — J30.9 ALLERGIC RHINITIS, UNSPECIFIED SEASONALITY, UNSPECIFIED TRIGGER: ICD-10-CM

## 2024-03-10 DIAGNOSIS — E87.6 LOW BLOOD POTASSIUM: ICD-10-CM

## 2024-03-10 RX ORDER — IBUPROFEN 600 MG/1
TABLET ORAL
Qty: 270 TABLET | Refills: 1 | Status: SHIPPED | OUTPATIENT
Start: 2024-03-10 | End: 2024-04-09 | Stop reason: SDUPTHER

## 2024-03-10 RX ORDER — POTASSIUM CHLORIDE 750 MG/1
10 TABLET, EXTENDED RELEASE ORAL DAILY
Qty: 90 TABLET | Refills: 0 | Status: SHIPPED | OUTPATIENT
Start: 2024-03-10

## 2024-04-01 ENCOUNTER — APPOINTMENT (OUTPATIENT)
Dept: SLEEP MEDICINE | Facility: CLINIC | Age: 64
End: 2024-04-01
Payer: MEDICAID

## 2024-04-08 DIAGNOSIS — J30.9 ALLERGIC RHINITIS, UNSPECIFIED SEASONALITY, UNSPECIFIED TRIGGER: ICD-10-CM

## 2024-04-09 DIAGNOSIS — J30.9 ALLERGIC RHINITIS, UNSPECIFIED SEASONALITY, UNSPECIFIED TRIGGER: ICD-10-CM

## 2024-04-09 RX ORDER — IBUPROFEN 600 MG/1
TABLET ORAL
Qty: 270 TABLET | Refills: 1 | Status: SHIPPED | OUTPATIENT
Start: 2024-04-09

## 2024-04-13 DIAGNOSIS — E11.9 CONTROLLED TYPE 2 DIABETES MELLITUS WITHOUT COMPLICATION, UNSPECIFIED WHETHER LONG TERM INSULIN USE (MULTI): ICD-10-CM

## 2024-04-14 RX ORDER — SIMVASTATIN 40 MG/1
40 TABLET, FILM COATED ORAL DAILY
Qty: 90 TABLET | Refills: 1 | Status: SHIPPED | OUTPATIENT
Start: 2024-04-14

## 2024-04-22 DIAGNOSIS — E11.9 CONTROLLED TYPE 2 DIABETES MELLITUS WITHOUT COMPLICATION, WITHOUT LONG-TERM CURRENT USE OF INSULIN (MULTI): ICD-10-CM

## 2024-04-22 RX ORDER — ALOGLIPTIN 25 MG/1
25 TABLET, FILM COATED ORAL DAILY
Qty: 90 TABLET | Refills: 1 | Status: SHIPPED | OUTPATIENT
Start: 2024-04-22

## 2024-04-22 RX ORDER — ALOGLIPTIN 25 MG/1
25 TABLET, FILM COATED ORAL DAILY
Qty: 90 TABLET | Refills: 0 | Status: SHIPPED | OUTPATIENT
Start: 2024-04-22

## 2024-04-28 ASSESSMENT — CUP TO DISC RATIO
OD_RATIO: .2
OS_RATIO: .2

## 2024-04-28 ASSESSMENT — SLIT LAMP EXAM - LIDS
COMMENTS: GOOD POSITION
COMMENTS: GOOD POSITION

## 2024-04-28 ASSESSMENT — EXTERNAL EXAM - LEFT EYE: OS_EXAM: NORMAL

## 2024-04-28 ASSESSMENT — EXTERNAL EXAM - RIGHT EYE: OD_EXAM: NORMAL

## 2024-04-28 NOTE — PROGRESS NOTES
Diabetes [id number]  -OCT macula (6/5/17) - SS: 5/10 OD and 6/10 OS. Normal thickness and contour OU. 226/232.   -HbA1c= 7.0 (2/22/24). No diabetic retinopathy seen on exam. Continue close monitoring of blood glucose, blood pressure, and cholesterol. Plan to repeat dilated exam annually.     Dry eyes, [id number]  -Seasonal allergies, spring and fall  -Add warm compresses daily  -Advised to continue artificial tears 2-4 times a day  -Uses Flonase  -May call for antihistamine drop recommendation as needed     Combined form of age-related cataract, left eyeH25.812  -Not visually significant at this time. Monitor. F/u 1 year for comprehensive examination with refraction, glare/BAT, dilation. Plan to repeat Lenstar/Pentacam/OCT macula when ready for cataract surgery OS.     Pseudophakia of right eyeZ96.1 - 1/17/19 - Complex with Trypan Blue  -Doing well. Good vision. IOP good.     Lattice degeneration of peripheral retina, dpmqdP47.411  -No retinal tear/detachment seen. Retinal detachment symptoms discussed.     Family history of glaucoma in [id number]  -OCT RNFL (6/5/17) - Signal strength 4/10 OD and 5/10 OS. WNL OU. 83/86.  -IOP okay and optic nerves appear healthy. Monitor annually.     Myopia with astigmatism and qtgittdzwkP87.10  -Current glasses from 2022.   -New Rx given per patient request, optional to fill.      Attending Attestation Statement for Evaluation and Management Services:    I was physically present and/or personally examined the patient during the evaluation of this patient. I reviewed the documentation and confirm the resident's note.

## 2024-04-29 ENCOUNTER — OFFICE VISIT (OUTPATIENT)
Dept: OPHTHALMOLOGY | Facility: CLINIC | Age: 64
End: 2024-04-29
Payer: MEDICAID

## 2024-04-29 DIAGNOSIS — H25.812 COMBINED FORM OF AGE-RELATED CATARACT, LEFT EYE: ICD-10-CM

## 2024-04-29 DIAGNOSIS — H52.13 MYOPIA OF BOTH EYES: ICD-10-CM

## 2024-04-29 DIAGNOSIS — E11.9 DIABETES MELLITUS WITHOUT COMPLICATION (MULTI): Primary | ICD-10-CM

## 2024-04-29 DIAGNOSIS — H52.203 ASTIGMATISM OF BOTH EYES, UNSPECIFIED TYPE: ICD-10-CM

## 2024-04-29 DIAGNOSIS — H04.123 DRY EYES, BILATERAL: ICD-10-CM

## 2024-04-29 DIAGNOSIS — Z96.1 PSEUDOPHAKIA: ICD-10-CM

## 2024-04-29 DIAGNOSIS — Z83.511 FAMILY HISTORY OF GLAUCOMA: ICD-10-CM

## 2024-04-29 DIAGNOSIS — H35.411 LATTICE DEGENERATION OF PERIPHERAL RETINA, RIGHT: ICD-10-CM

## 2024-04-29 DIAGNOSIS — H52.4 PRESBYOPIA: ICD-10-CM

## 2024-04-29 PROCEDURE — 1036F TOBACCO NON-USER: CPT | Performed by: OPHTHALMOLOGY

## 2024-04-29 PROCEDURE — 92015 DETERMINE REFRACTIVE STATE: CPT | Performed by: OPHTHALMOLOGY

## 2024-04-29 PROCEDURE — 99214 OFFICE O/P EST MOD 30 MIN: CPT | Performed by: OPHTHALMOLOGY

## 2024-04-29 ASSESSMENT — ENCOUNTER SYMPTOMS
HEMATOLOGIC/LYMPHATIC NEGATIVE: 0
CONSTITUTIONAL NEGATIVE: 0
CARDIOVASCULAR NEGATIVE: 0
ALLERGIC/IMMUNOLOGIC NEGATIVE: 0
PSYCHIATRIC NEGATIVE: 0
MUSCULOSKELETAL NEGATIVE: 0
EYES NEGATIVE: 1
NEUROLOGICAL NEGATIVE: 0
RESPIRATORY NEGATIVE: 0
ENDOCRINE NEGATIVE: 0
GASTROINTESTINAL NEGATIVE: 0

## 2024-04-29 ASSESSMENT — VISUAL ACUITY
METHOD: SNELLEN - LINEAR
OD_CC: 20/20
OS_BAT_MED: 20/25
OS_CC: 20/25
CORRECTION_TYPE: GLASSES
OD_BAT_MED: 20/20

## 2024-04-29 ASSESSMENT — REFRACTION_WEARINGRX
OD_AXIS: 130
OS_AXIS: 025
OS_CYLINDER: -2.25
OD_CYLINDER: -0.50
OS_ADD: +2.50
OS_SPHERE: -4.50
OD_SPHERE: -2.50
OD_ADD: +2.50

## 2024-04-29 ASSESSMENT — REFRACTION_MANIFEST
OS_ADD: +2.50
OD_CYLINDER: -0.50
OS_AXIS: 025
OD_AXIS: 130
OD_SPHERE: -2.50
OD_ADD: +2.50
OS_SPHERE: -4.50
OS_CYLINDER: -2.25

## 2024-04-29 ASSESSMENT — TONOMETRY
OD_IOP_MMHG: 15
IOP_METHOD: GOLDMANN APPLANATION
OS_IOP_MMHG: 15

## 2024-05-13 DIAGNOSIS — J30.9 ALLERGIC RHINITIS, UNSPECIFIED SEASONALITY, UNSPECIFIED TRIGGER: Primary | ICD-10-CM

## 2024-05-13 RX ORDER — FEXOFENADINE HYDROCHLORIDE 180 MG/1
180 TABLET, FILM COATED ORAL DAILY PRN
Qty: 90 TABLET | Refills: 0 | Status: SHIPPED | OUTPATIENT
Start: 2024-05-13

## 2024-05-13 RX ORDER — FEXOFENADINE HYDROCHLORIDE 180 MG/1
180 TABLET, FILM COATED ORAL DAILY PRN
Qty: 90 TABLET | Refills: 3 | Status: SHIPPED | OUTPATIENT
Start: 2024-05-13

## 2024-05-15 DIAGNOSIS — J30.9 ALLERGIC RHINITIS, UNSPECIFIED SEASONALITY, UNSPECIFIED TRIGGER: Primary | ICD-10-CM

## 2024-05-15 RX ORDER — FLUTICASONE PROPIONATE 50 MCG
2 SPRAY, SUSPENSION (ML) NASAL DAILY
Qty: 32 G | Refills: 11 | Status: SHIPPED | OUTPATIENT
Start: 2024-05-15

## 2024-05-24 ENCOUNTER — APPOINTMENT (OUTPATIENT)
Dept: PRIMARY CARE | Facility: CLINIC | Age: 64
End: 2024-05-24
Payer: MEDICAID

## 2024-06-01 DIAGNOSIS — M43.10 ACQUIRED SPONDYLOLISTHESIS: ICD-10-CM

## 2024-06-03 DIAGNOSIS — M43.10 ACQUIRED SPONDYLOLISTHESIS: ICD-10-CM

## 2024-06-03 RX ORDER — GABAPENTIN 600 MG/1
TABLET ORAL
Qty: 90 TABLET | Refills: 3 | Status: SHIPPED | OUTPATIENT
Start: 2024-06-03

## 2024-06-17 DIAGNOSIS — I10 HYPERTENSION: ICD-10-CM

## 2024-06-17 RX ORDER — CARVEDILOL 12.5 MG/1
12.5 TABLET ORAL
Qty: 180 TABLET | Refills: 1 | Status: SHIPPED | OUTPATIENT
Start: 2024-06-17 | End: 2025-06-17

## 2024-06-17 NOTE — TELEPHONE ENCOUNTER
----- Message from Heaven Alfaor sent at 6/15/2024  2:49 PM EDT -----  Regarding: Carvedilol 12.5Mg tabs  Contact: [phone]  Refill needed asap [email or web address] out tomorrow   Send into [facility] on [address] phone : [phone]. Thanks

## 2024-06-18 ENCOUNTER — APPOINTMENT (OUTPATIENT)
Dept: PRIMARY CARE | Facility: CLINIC | Age: 64
End: 2024-06-18
Payer: MEDICAID

## 2024-06-18 VITALS
RESPIRATION RATE: 18 BRPM | SYSTOLIC BLOOD PRESSURE: 139 MMHG | DIASTOLIC BLOOD PRESSURE: 78 MMHG | HEIGHT: 64 IN | BODY MASS INDEX: 34.18 KG/M2 | WEIGHT: 200.2 LBS | OXYGEN SATURATION: 96 % | HEART RATE: 63 BPM

## 2024-06-18 DIAGNOSIS — E11.9 CONTROLLED TYPE 2 DIABETES MELLITUS WITHOUT COMPLICATION, WITHOUT LONG-TERM CURRENT USE OF INSULIN (MULTI): ICD-10-CM

## 2024-06-18 DIAGNOSIS — I10 PRIMARY HYPERTENSION: ICD-10-CM

## 2024-06-18 DIAGNOSIS — E78.00 PURE HYPERCHOLESTEROLEMIA: ICD-10-CM

## 2024-06-18 DIAGNOSIS — E11.9 CONTROLLED TYPE 2 DIABETES MELLITUS WITHOUT COMPLICATION, UNSPECIFIED WHETHER LONG TERM INSULIN USE (MULTI): ICD-10-CM

## 2024-06-18 DIAGNOSIS — E11.9 DIABETES MELLITUS WITHOUT COMPLICATION (MULTI): ICD-10-CM

## 2024-06-18 DIAGNOSIS — R09.82 PND (POST-NASAL DRIP): Primary | ICD-10-CM

## 2024-06-18 LAB — POC HEMOGLOBIN A1C: 7.1 % (ref 4.2–6.5)

## 2024-06-18 PROCEDURE — 3049F LDL-C 100-129 MG/DL: CPT | Performed by: FAMILY MEDICINE

## 2024-06-18 PROCEDURE — 83036 HEMOGLOBIN GLYCOSYLATED A1C: CPT | Performed by: FAMILY MEDICINE

## 2024-06-18 PROCEDURE — 80061 LIPID PANEL: CPT

## 2024-06-18 PROCEDURE — 36415 COLL VENOUS BLD VENIPUNCTURE: CPT

## 2024-06-18 PROCEDURE — 3078F DIAST BP <80 MM HG: CPT | Performed by: FAMILY MEDICINE

## 2024-06-18 PROCEDURE — 80053 COMPREHEN METABOLIC PANEL: CPT

## 2024-06-18 PROCEDURE — 85027 COMPLETE CBC AUTOMATED: CPT

## 2024-06-18 PROCEDURE — 3075F SYST BP GE 130 - 139MM HG: CPT | Performed by: FAMILY MEDICINE

## 2024-06-18 PROCEDURE — 99214 OFFICE O/P EST MOD 30 MIN: CPT | Performed by: FAMILY MEDICINE

## 2024-06-18 RX ORDER — SIMVASTATIN 40 MG/1
40 TABLET, FILM COATED ORAL DAILY
Qty: 90 TABLET | Refills: 1 | Status: SHIPPED | OUTPATIENT
Start: 2024-06-18

## 2024-06-18 RX ORDER — LOSARTAN POTASSIUM AND HYDROCHLOROTHIAZIDE 12.5; 1 MG/1; MG/1
1 TABLET ORAL DAILY
Qty: 90 TABLET | Refills: 1 | Status: SHIPPED | OUTPATIENT
Start: 2024-06-18

## 2024-06-18 RX ORDER — ALOGLIPTIN 25 MG/1
25 TABLET, FILM COATED ORAL DAILY
Qty: 90 TABLET | Refills: 0 | Status: SHIPPED | OUTPATIENT
Start: 2024-06-18

## 2024-06-18 RX ORDER — METFORMIN HYDROCHLORIDE 500 MG/1
TABLET, EXTENDED RELEASE ORAL
Qty: 180 TABLET | Refills: 1 | Status: SHIPPED | OUTPATIENT
Start: 2024-06-18

## 2024-06-19 PROBLEM — R09.82 PND (POST-NASAL DRIP): Status: ACTIVE | Noted: 2024-06-19

## 2024-06-19 PROBLEM — E11.9 CONTROLLED TYPE 2 DIABETES MELLITUS WITHOUT COMPLICATION (MULTI): Status: ACTIVE | Noted: 2024-06-19

## 2024-06-19 LAB
ALBUMIN SERPL BCP-MCNC: 4.4 G/DL (ref 3.4–5)
ALP SERPL-CCNC: 53 U/L (ref 33–136)
ALT SERPL W P-5'-P-CCNC: 21 U/L (ref 7–45)
ANION GAP SERPL CALC-SCNC: 15 MMOL/L (ref 10–20)
AST SERPL W P-5'-P-CCNC: 19 U/L (ref 9–39)
BILIRUB SERPL-MCNC: 0.8 MG/DL (ref 0–1.2)
BUN SERPL-MCNC: 20 MG/DL (ref 6–23)
CALCIUM SERPL-MCNC: 9.9 MG/DL (ref 8.6–10.6)
CHLORIDE SERPL-SCNC: 108 MMOL/L (ref 98–107)
CHOLEST SERPL-MCNC: 163 MG/DL (ref 0–199)
CHOLESTEROL/HDL RATIO: 4.1
CO2 SERPL-SCNC: 23 MMOL/L (ref 21–32)
CREAT SERPL-MCNC: 0.73 MG/DL (ref 0.5–1.05)
EGFRCR SERPLBLD CKD-EPI 2021: >90 ML/MIN/1.73M*2
ERYTHROCYTE [DISTWIDTH] IN BLOOD BY AUTOMATED COUNT: 14 % (ref 11.5–14.5)
GLUCOSE SERPL-MCNC: 90 MG/DL (ref 74–99)
HCT VFR BLD AUTO: 40.5 % (ref 36–46)
HDLC SERPL-MCNC: 39.5 MG/DL
HGB BLD-MCNC: 12.9 G/DL (ref 12–16)
LDLC SERPL CALC-MCNC: 104 MG/DL
MCH RBC QN AUTO: 28 PG (ref 26–34)
MCHC RBC AUTO-ENTMCNC: 31.9 G/DL (ref 32–36)
MCV RBC AUTO: 88 FL (ref 80–100)
NON HDL CHOLESTEROL: 124 MG/DL (ref 0–149)
NRBC BLD-RTO: 0 /100 WBCS (ref 0–0)
PLATELET # BLD AUTO: 301 X10*3/UL (ref 150–450)
POTASSIUM SERPL-SCNC: 3.8 MMOL/L (ref 3.5–5.3)
PROT SERPL-MCNC: 7.3 G/DL (ref 6.4–8.2)
RBC # BLD AUTO: 4.6 X10*6/UL (ref 4–5.2)
SODIUM SERPL-SCNC: 142 MMOL/L (ref 136–145)
TRIGL SERPL-MCNC: 98 MG/DL (ref 0–149)
VLDL: 20 MG/DL (ref 0–40)
WBC # BLD AUTO: 6.5 X10*3/UL (ref 4.4–11.3)

## 2024-06-19 ASSESSMENT — ENCOUNTER SYMPTOMS
ENDOCRINE NEGATIVE: 1
ALLERGIC/IMMUNOLOGIC NEGATIVE: 1
HEMATOLOGIC/LYMPHATIC NEGATIVE: 1
RESPIRATORY NEGATIVE: 1
CARDIOVASCULAR NEGATIVE: 1
FEVER: 0
ARTHRALGIAS: 1
NAUSEA: 0
GASTROINTESTINAL NEGATIVE: 1
BACK PAIN: 1
PSYCHIATRIC NEGATIVE: 1
CHILLS: 0
NEUROLOGICAL NEGATIVE: 1
VOMITING: 0

## 2024-06-20 NOTE — PROGRESS NOTES
Subjective   Patient ID: Heaven Alfaro is a 64 y.o. female who presents for Follow-up (Follow up diabetes).      HPI  Patient is here for follow-up of hypertension hyperlipidemia has been taking meds as prescribed denies chest pain shortness of breath s myalgias dizziness been eating healthy and trying to increase exercise  A1c higher does not want any injectable  Has been having Low back pain.     Current Outpatient Medications on File Prior to Visit   Medication Sig Dispense Refill    albuterol (Ventolin HFA) 90 mcg/actuation inhaler Inhale 2 puffs every 4 hours if needed for wheezing or shortness of breath. 8 g 11    carvedilol (Coreg) 12.5 mg tablet Take 1 tablet (12.5 mg) by mouth 2 times daily (morning and late afternoon). 180 tablet 1    estradiol (Estrace) 0.01 % (0.1 mg/gram) vaginal cream APPLY A PEA-SIZED AMOUNT TO urethral and vagina EVERY EVENING FOR TWO WEEKS, THEN DECREASE TO EVERY MONDAY, WEDNESDAY AND FRIDAY THEREAFTER      fexofenadine (Allergy Relief, fexofenadine,) 180 mg tablet TAKE 1 TABLET BY MOUTH DAILY AS NEEDED 90 tablet 0    fexofenadine (Allergy Relief, fexofenadine,) 180 mg tablet TAKE 1 TABLET BY MOUTH DAILY AS NEEDED (Patient not taking: Reported on 6/18/2024) 90 tablet 3    fluticasone (Flonase) 50 mcg/actuation nasal spray SHAKE LIQUID AND USE 2 SPRAYS IN EACH NOSTRIL EVERY DAY 32 g 11    gabapentin (Neurontin) 600 mg tablet TAKE 1 TABLET(600 MG) BY MOUTH THREE TIMES DAILY 90 tablet 3    ibuprofen 600 mg tablet TAKE 1 TABLET BY MOUTH EVERY MORNING, EVERY EVENING AND EVERY NIGHT AT BEDTIME 270 tablet 1    moisturizing mouth (Biotene Moisturizing Mouth) solution USE AS DIRECTED AS A SALIVA SUBSTITUTE      OneTouch Ultra Test strip USE 1 STRIP DAILY TO TEST BLOOD SUGAR 100 strip 1    potassium chloride CR 10 mEq ER tablet TAKE 1 TABLET BY MOUTH DAILY 90 tablet 0    [DISCONTINUED] alogliptin (Nesina) 25 mg tablet TAKE 1 TABLET(25 MG) BY MOUTH EVERY DAY 90 tablet 0    [DISCONTINUED]  "losartan-hydrochlorothiazide (Hyzaar) 100-12.5 mg tablet Take 1 tablet by mouth once daily. 90 tablet 1    [DISCONTINUED] metFORMIN XR (Glucophage-XR) 500 mg 24 hr tablet 1 tablet (500 mg) 2 times daily (morning and late afternoon).      [DISCONTINUED] simvastatin (Zocor) 40 mg tablet TAKE 1 TABLET(40 MG) BY MOUTH EVERY DAY 90 tablet 1    alogliptin (Nesina) 25 mg tablet Take 1 tablet (25 mg) by mouth once daily. (Patient not taking: Reported on 6/18/2024) 90 tablet 1    melatonin 3 mg tablet TAKE 1 TABLET BY MOUTH AT BEDTIME AS NEEDED      [DISCONTINUED] carvedilol (Coreg) 12.5 mg tablet Take 1 tablet (12.5 mg) by mouth 2 times a day with meals. 180 tablet 0     No current facility-administered medications on file prior to visit.        Review of Systems   Constitutional:  Negative for chills and fever.   HENT: Negative.     Respiratory: Negative.     Cardiovascular: Negative.    Gastrointestinal: Negative.  Negative for nausea and vomiting.   Endocrine: Negative.    Genitourinary: Negative.    Musculoskeletal:  Positive for arthralgias and back pain.   Skin: Negative.  Negative for rash.   Allergic/Immunologic: Negative.    Neurological: Negative.    Hematological: Negative.    Psychiatric/Behavioral: Negative.     All other systems reviewed and are negative.      Objective   /78   Pulse 63   Resp 18   Ht 1.626 m (5' 4\")   Wt 90.8 kg (200 lb 3.2 oz)   SpO2 96%   BMI 34.36 kg/m²   BSA: 2.02 meters squared  Growth percentiles: Facility age limit for growth %cesia is 20 years. Facility age limit for growth %cesia is 20 years.   Office Visit on 06/18/2024   Component Date Value Ref Range Status    POC HEMOGLOBIN A1c 06/18/2024 7.1 (A)  4.2 - 6.5 % Final    WBC 06/18/2024 6.5  4.4 - 11.3 x10*3/uL Final    nRBC 06/18/2024 0.0  0.0 - 0.0 /100 WBCs Final    RBC 06/18/2024 4.60  4.00 - 5.20 x10*6/uL Final    Hemoglobin 06/18/2024 12.9  12.0 - 16.0 g/dL Final    Hematocrit 06/18/2024 40.5  36.0 - 46.0 % Final    " MCV 06/18/2024 88  80 - 100 fL Final    MCH 06/18/2024 28.0  26.0 - 34.0 pg Final    MCHC 06/18/2024 31.9 (L)  32.0 - 36.0 g/dL Final    RDW 06/18/2024 14.0  11.5 - 14.5 % Final    Platelets 06/18/2024 301  150 - 450 x10*3/uL Final    Glucose 06/18/2024 90  74 - 99 mg/dL Final    Sodium 06/18/2024 142  136 - 145 mmol/L Final    Potassium 06/18/2024 3.8  3.5 - 5.3 mmol/L Final    Chloride 06/18/2024 108 (H)  98 - 107 mmol/L Final    Bicarbonate 06/18/2024 23  21 - 32 mmol/L Final    Anion Gap 06/18/2024 15  10 - 20 mmol/L Final    Urea Nitrogen 06/18/2024 20  6 - 23 mg/dL Final    Creatinine 06/18/2024 0.73  0.50 - 1.05 mg/dL Final    eGFR 06/18/2024 >90  >60 mL/min/1.73m*2 Final    Calculations of estimated GFR are performed using the 2021 CKD-EPI Study Refit equation without the race variable for the IDMS-Traceable creatinine methods.  https://jasn.asnjournals.org/content/early/2021/09/22/ASN.6134969833    Calcium 06/18/2024 9.9  8.6 - 10.6 mg/dL Final    Albumin 06/18/2024 4.4  3.4 - 5.0 g/dL Final    Alkaline Phosphatase 06/18/2024 53  33 - 136 U/L Final    Total Protein 06/18/2024 7.3  6.4 - 8.2 g/dL Final    AST 06/18/2024 19  9 - 39 U/L Final    Bilirubin, Total 06/18/2024 0.8  0.0 - 1.2 mg/dL Final    ALT 06/18/2024 21  7 - 45 U/L Final    Patients treated with Sulfasalazine may generate falsely decreased results for ALT.    Cholesterol 06/18/2024 163  0 - 199 mg/dL Final          Age      Desirable   Borderline High   High     0-19 Y     0 - 169       170 - 199     >/= 200    20-24 Y     0 - 189       190 - 224     >/= 225         >24 Y     0 - 199       200 - 239     >/= 240   **All ranges are based on fasting samples. Specific   therapeutic targets will vary based on patient-specific   cardiac risk.    Pediatric guidelines reference:Pediatrics 2011, 128(S5).Adult guidelines reference: NCEP ATPIII Guidelines,CHARO 2001, 258:2486-97    Venipuncture immediately after or during the administration of Metamizole  may lead to falsely low results. Testing should be performed immediately prior to Metamizole dosing.    HDL-Cholesterol 06/18/2024 39.5  mg/dL Final      Age       Very Low   Low     Normal    High    0-19 Y    < 35      < 40     40-45     ----  20-24 Y    ----     < 40      >45      ----        >24 Y      ----     < 40     40-60      >60      Cholesterol/HDL Ratio 06/18/2024 4.1   Final      Ref Values  Desirable  < 3.4  High Risk  > 5.0    LDL Calculated 06/18/2024 104 (H)  <=99 mg/dL Final                                Near   Borderline      AGE      Desirable  Optimal    High     High     Very High     0-19 Y     0 - 109     ---    110-129   >/= 130     ----    20-24 Y     0 - 119     ---    120-159   >/= 160     ----      >24 Y     0 -  99   100-129  130-159   160-189     >/=190      VLDL 06/18/2024 20  0 - 40 mg/dL Final    Triglycerides 06/18/2024 98  0 - 149 mg/dL Final       Age         Desirable   Borderline High   High     Very High   0 D-90 D    19 - 174         ----         ----        ----  91 D- 9 Y     0 -  74        75 -  99     >/= 100      ----    10-19 Y     0 -  89        90 - 129     >/= 130      ----    20-24 Y     0 - 114       115 - 149     >/= 150      ----         >24 Y     0 - 149       150 - 199    200- 499    >/= 500    Venipuncture immediately after or during the administration of Metamizole may lead to falsely low results. Testing should be performed immediately prior to Metamizole dosing.    Non HDL Cholesterol 06/18/2024 124  0 - 149 mg/dL Final          Age       Desirable   Borderline High   High     Very High     0-19 Y     0 - 119       120 - 144     >/= 145    >/= 160    20-24 Y     0 - 149       150 - 189     >/= 190      ----         >24 Y    30 mg/dL above LDL Cholesterol goal        Physical Exam  Constitutional:       General: She is not in acute distress.  Eyes:      Extraocular Movements: Extraocular movements intact.   Cardiovascular:      Rate and Rhythm: Normal rate  and regular rhythm.   Pulmonary:      Breath sounds: Normal breath sounds.   Abdominal:      General: Bowel sounds are normal.   Musculoskeletal:         General: Tenderness present. Normal range of motion.      Cervical back: No rigidity.   Skin:     Findings: No rash.   Neurological:      General: No focal deficit present.      Mental Status: She is alert.   Psychiatric:         Thought Content: Thought content normal.       Assessment/Plan   Problem List Items Addressed This Visit             ICD-10-CM    Diabetes mellitus without complication (Multi) E11.9     Will see if Ryblesus. If covered.         Relevant Medications    alogliptin (Nesina) 25 mg tablet    metFORMIN  mg 24 hr tablet    simvastatin (Zocor) 40 mg tablet    semaglutide (Rybelsus) 3 mg tablet    Hyperlipidemia E78.5     Check lipid panel continue medications continue healthy eating work out  150 minutes a week c/w simvastatin           Hypertension I10    Relevant Medications    losartan-hydrochlorothiazide (Hyzaar) 100-12.5 mg tablet    metFORMIN  mg 24 hr tablet    semaglutide (Rybelsus) 3 mg tablet    Other Relevant Orders    CBC (Completed)    Comprehensive Metabolic Panel (Completed)    Lipid Panel (Completed)    PND (post-nasal drip) - Primary R09.82    Relevant Orders    Referral to ENT    CBC (Completed)    Comprehensive Metabolic Panel (Completed)    Lipid Panel (Completed)    Controlled type 2 diabetes mellitus without complication (Multi) E11.9    Relevant Medications    alogliptin (Nesina) 25 mg tablet    metFORMIN  mg 24 hr tablet    simvastatin (Zocor) 40 mg tablet    semaglutide (Rybelsus) 3 mg tablet    Other Relevant Orders    POCT glycosylated hemoglobin (Hb A1C) manually resulted (Completed)    CBC (Completed)    Comprehensive Metabolic Panel (Completed)    Lipid Panel (Completed)    CBC (Completed)    Comprehensive Metabolic Panel (Completed)    Lipid Panel (Completed)

## 2024-06-20 NOTE — ASSESSMENT & PLAN NOTE
Check lipid panel continue medications continue healthy eating work out  150 minutes a week c/w simvastatin

## 2024-06-26 ENCOUNTER — TELEPHONE (OUTPATIENT)
Dept: PRIMARY CARE | Facility: CLINIC | Age: 64
End: 2024-06-26
Payer: MEDICAID

## 2024-07-05 ENCOUNTER — APPOINTMENT (OUTPATIENT)
Dept: SLEEP MEDICINE | Facility: CLINIC | Age: 64
End: 2024-07-05
Payer: MEDICAID

## 2024-07-19 ENCOUNTER — APPOINTMENT (OUTPATIENT)
Dept: SLEEP MEDICINE | Facility: CLINIC | Age: 64
End: 2024-07-19
Payer: MEDICAID

## 2024-07-19 VITALS
TEMPERATURE: 98 F | WEIGHT: 202.8 LBS | DIASTOLIC BLOOD PRESSURE: 83 MMHG | BODY MASS INDEX: 34.62 KG/M2 | HEIGHT: 64 IN | HEART RATE: 72 BPM | SYSTOLIC BLOOD PRESSURE: 136 MMHG

## 2024-07-19 DIAGNOSIS — G47.33 OSA (OBSTRUCTIVE SLEEP APNEA): Primary | ICD-10-CM

## 2024-07-19 DIAGNOSIS — G47.00 INSOMNIA, UNSPECIFIED TYPE: ICD-10-CM

## 2024-07-19 DIAGNOSIS — I10 PRIMARY HYPERTENSION: ICD-10-CM

## 2024-07-19 DIAGNOSIS — J30.9 ALLERGIC RHINITIS, UNSPECIFIED SEASONALITY, UNSPECIFIED TRIGGER: ICD-10-CM

## 2024-07-19 PROCEDURE — 3049F LDL-C 100-129 MG/DL: CPT | Performed by: STUDENT IN AN ORGANIZED HEALTH CARE EDUCATION/TRAINING PROGRAM

## 2024-07-19 PROCEDURE — 3008F BODY MASS INDEX DOCD: CPT | Performed by: STUDENT IN AN ORGANIZED HEALTH CARE EDUCATION/TRAINING PROGRAM

## 2024-07-19 PROCEDURE — 3079F DIAST BP 80-89 MM HG: CPT | Performed by: STUDENT IN AN ORGANIZED HEALTH CARE EDUCATION/TRAINING PROGRAM

## 2024-07-19 PROCEDURE — G2211 COMPLEX E/M VISIT ADD ON: HCPCS | Performed by: STUDENT IN AN ORGANIZED HEALTH CARE EDUCATION/TRAINING PROGRAM

## 2024-07-19 PROCEDURE — 99214 OFFICE O/P EST MOD 30 MIN: CPT | Performed by: STUDENT IN AN ORGANIZED HEALTH CARE EDUCATION/TRAINING PROGRAM

## 2024-07-19 PROCEDURE — 3075F SYST BP GE 130 - 139MM HG: CPT | Performed by: STUDENT IN AN ORGANIZED HEALTH CARE EDUCATION/TRAINING PROGRAM

## 2024-07-19 PROCEDURE — 1036F TOBACCO NON-USER: CPT | Performed by: STUDENT IN AN ORGANIZED HEALTH CARE EDUCATION/TRAINING PROGRAM

## 2024-07-19 ASSESSMENT — SLEEP AND FATIGUE QUESTIONNAIRES
HOW LIKELY ARE YOU TO NOD OFF OR FALL ASLEEP WHILE LYING DOWN TO REST IN THE AFTERNOON WHEN CIRCUMSTANCES PERMIT: MODERATE CHANCE OF DOZING
SITING INACTIVE IN A PUBLIC PLACE LIKE A CLASS ROOM OR A MOVIE THEATER: SLIGHT CHANCE OF DOZING
SLEEP_PROBLEM_INTERFERES_DAILY_ACTIVITIES: A LITTLE
DIFFICULTY_FALLING_ASLEEP: MODERATE
ESS-CHAD TOTAL SCORE: 6
WORRIED_DISTRESSED_DUE_TO_SLEEP: SOMEWHAT
HOW LIKELY ARE YOU TO NOD OFF OR FALL ASLEEP WHILE SITTING QUIETLY AFTER LUNCH WITHOUT ALCOHOL: WOULD NEVER DOZE
HOW LIKELY ARE YOU TO NOD OFF OR FALL ASLEEP WHILE WATCHING TV: SLIGHT CHANCE OF DOZING
HOW LIKELY ARE YOU TO NOD OFF OR FALL ASLEEP WHILE SITTING AND TALKING TO SOMEONE: WOULD NEVER DOZE
SATISFACTION_WITH_CURRENT_SLEEP_PATTERN: SATISFIED
HOW LIKELY ARE YOU TO NOD OFF OR FALL ASLEEP WHILE SITTING AND READING: SLIGHT CHANCE OF DOZING
HOW LIKELY ARE YOU TO NOD OFF OR FALL ASLEEP IN A CAR, WHILE STOPPED FOR A FEW MINUTES IN TRAFFIC: SLIGHT CHANCE OF DOZING
SLEEP_PROBLEM_NOTICEABLE_TO_OTHERS: SOMEWHAT
WAKING_TOO_EARLY: MILD
HOW LIKELY ARE YOU TO NOD OFF OR FALL ASLEEP WHEN YOU ARE A PASSENGER IN A CAR FOR AN HOUR WITHOUT A BREAK: WOULD NEVER DOZE

## 2024-07-19 ASSESSMENT — ENCOUNTER SYMPTOMS
RESPIRATORY NEGATIVE: 1
CONSTITUTIONAL NEGATIVE: 1
CARDIOVASCULAR NEGATIVE: 1
PSYCHIATRIC NEGATIVE: 1
NEUROLOGICAL NEGATIVE: 1

## 2024-07-19 NOTE — ASSESSMENT & PLAN NOTE
BP Readings from Last 1 Encounters:   07/19/24 136/83     - doing well, asymptomatic  - discussed at length the impact of untreated MOLLY and BP control  - continue current management and follow-up with PCP

## 2024-07-19 NOTE — ASSESSMENT & PLAN NOTE
- having trouble with using CPAP recently as her rhinitis is flaring up.  Going to see ENT soon  - continue current setting  - renew PAP supply orders

## 2024-07-19 NOTE — PROGRESS NOTES
Patient: Heaven Alfaro    [id number]  : 1960 -- AGE 64 y.o.    Provider: Neo Almonte MD     Location [facility]   Service Date: 2024              [facility]  Followup Visit Note    HISTORY OF PRESENT ILLNESS     HISTORY OF PRESENT ILLNESS   Heaven Alfaro is a 64 y.o. female with h/o Hypertension, MOLLY, and Obesity who presents to a [facility] for followup.     Assessment and plan from last visit: 2023    62 yo female with h/o T2DM, HTN, MOLLY here for f/u visit     # MOLLY  - have been on 12-16 cwp for a while  - no issue with PAP therapy except when her nasal congestion is bad  - compliance not great, usage subpar as she suffers from rhinitis quite a bit -> same problem, but she doesn't seem to be alarmed enough to go get her nasal spray refill.   - she also inquired about INSPIRE therapy -> still needs weight loss.  - encouraged usage extension  - will consider ordering a repeat HSAT to evaluate SDB burden at f/u     # HTN  - 131/77 today  - no headache, blurry vision, chest pain, palpitation, dizziness, syncopal episodes   - continue to f/u with PCP      # Obesity  - BMI 35  - Encouraged continuing healthy weight loss via diet and exercise   - continue to f/u with PCP      # Rhinitis  - demonstrated proper usage  - renew Flonase     # Insomnia  - encouraged practicing sleep restriction technique     RTC in 6 months     Current History    On today's visit, the patient reports that recently her rhinitis has just been going out of control which prevents her from using her CPAP consistently.  She has a referral to ENT but has not made an appointment.  She currently uses her Flonase daily in the morning.  She wants to use her CPAP as it does make her feel better.  BP is controlled today, doing well.  Some weight loss, which is great.    PAP Info  DURABLE MEDICAL EQUIPMENT COMPANY: [employer]  Machine: THERAPY: RESMED  AIRSENSE 10  12 cm H2O - 16 cm H2O  Issues with therapy: ISSUES WITH THERAPY: rhinitis issues  Benefits with PAP: PERCEIVED BENEFITS OF PAP: refreshing sleep    RLS Followup:   none.    Daytime Symptoms    Patient reports DAYTIME SYMPTOMS: no daytime symptoms  Patient denies daytime symptoms including: Denies: excessive daytime sleepiness    Naps: No  Fatigue: denies feeling fatigue    ESS: 6  PACHECO: 9  FOSQ: 32    REVIEW OF SYSTEMS     REVIEW OF SYSTEMS  Review of Systems   Constitutional: Negative.    HENT: Negative.     Respiratory: Negative.     Cardiovascular: Negative.    Genitourinary: Negative.    Skin: Negative.    Neurological: Negative.    Psychiatric/Behavioral: Negative.           ALLERGIES AND MEDICATIONS     ALLERGIES  Allergies   Allergen Reactions   • Chocolate Flavor Unknown   • Oxycodone-Acetaminophen Other   • Propoxyphene N-Acetaminophen Other       MEDICATIONS: She has a current medication list which includes the following prescription(s): albuterol - Inhale 2 puffs every 4 hours if needed for wheezing or shortness of breath, alogliptin - Take 1 tablet (25 mg) by mouth once daily, carvedilol - Take 1 tablet (12.5 mg) by mouth 2 times daily (morning and late afternoon), estradiol - APPLY A PEA-SIZED AMOUNT TO urethral and vagina EVERY EVENING FOR TWO WEEKS, THEN DECREASE TO EVERY MONDAY, WEDNESDAY AND FRIDAY THEREAFTER, allergy relief (fexofenadine) - TAKE 1 TABLET BY MOUTH DAILY AS NEEDED, allergy relief (fexofenadine) - TAKE 1 TABLET BY MOUTH DAILY AS NEEDED (Patient not taking: Reported on 6/18/2024), fluticasone - SHAKE LIQUID AND USE 2 SPRAYS IN EACH NOSTRIL EVERY DAY, gabapentin - TAKE 1 TABLET(600 MG) BY MOUTH THREE TIMES DAILY, ibuprofen - TAKE 1 TABLET BY MOUTH EVERY MORNING, EVERY EVENING AND EVERY NIGHT AT BEDTIME, losartan-hydrochlorothiazide - Take 1 tablet by mouth once daily, melatonin - TAKE 1 TABLET BY MOUTH AT BEDTIME AS NEEDED, metformin xr - 1 tablet (500 mg) 2 times daily  "(morning and late afternoon), biotene moisturizing mouth - USE AS DIRECTED AS A SALIVA SUBSTITUTE, onetouch ultra test - USE 1 STRIP DAILY TO TEST BLOOD SUGAR, potassium chloride cr - TAKE 1 TABLET BY MOUTH DAILY, simvastatin - Take 1 tablet (40 mg) by mouth once daily, alogliptin - Take 1 tablet (25 mg) by mouth once daily (Patient not taking: Reported on 6/18/2024), and semaglutide - Take 1 tablet (3 mg) by mouth once daily (Patient not taking: Reported on 7/19/2024).    PAST MEDICAL HISTORY : She  has a past medical history of Abnormal finding in urine (02/05/2023), Acute UTI (02/05/2023), Bacterial vaginosis (02/05/2023), Cataract, Diabetes mellitus (Multi), Dry eyes, Electrolyte imbalance (06/27/2012), Lattice degeneration of peripheral retina, right, Other conditions influencing health status, and Vaginal yeast infection (02/05/2023).    PAST SURGICAL HISTORY: She  has a past surgical history that includes Hysterectomy; Other surgical history (05/04/2018); and Cataract extraction w/  intraocular lens implant (Right, 01/17/2019).     FAMILY HISTORY: No changes since previous visit. Otherwise non-contributory as charted.     SOCIAL HISTORY  She  reports that she has never smoked. She has never used smokeless tobacco. She reports current alcohol use. She reports that she does not use drugs.       PHYSICAL EXAM     VITAL SIGNS: /83 (BP Location: Right arm, Patient Position: Sitting, BP Cuff Size: Large adult)   Pulse 72   Temp 36.7 °C (98 °F) (Temporal)   Ht 1.626 m (5' 4\")   Wt 92 kg (202 lb 12.8 oz)   BMI 34.81 kg/m²      PREVIOUS WEIGHTS:  Wt Readings from Last 3 Encounters:   07/19/24 92 kg (202 lb 12.8 oz)   06/18/24 90.8 kg (200 lb 3.2 oz)   02/22/24 89.8 kg (198 lb)         RESULTS/DATA     Bicarbonate (mmol/L)   Date Value   06/18/2024 23   08/17/2023 23   09/29/2022 26   10/14/2021 29       PAP Adherence  A PAP adherence download was obtained and data was reviewed personally today in " clinic.        ASSESSMENT/PLAN     Ms. Alfaro is a 64 y.o. female and she returns in followup to the [facility] for MOLLY.    Problem List, Orders, Assessment, Recommendations:  Problem List Items Addressed This Visit             ICD-10-CM    Allergic rhinitis J30.9     Increase Flonase to twice a day  Encouraged to go see ENT.         Hypertension I10     BP Readings from Last 1 Encounters:   07/19/24 136/83     - doing well, asymptomatic  - discussed at length the impact of untreated MOLLY and BP control  - continue current management and follow-up with PCP          Insomnia G47.00     Much better now with her sleep hygiene and insomnia is no longer a significant problem         MOLLY (obstructive sleep apnea) - Primary G47.33     - having trouble with using CPAP recently as her rhinitis is flaring up.  Going to see ENT soon  - continue current setting  - renew PAP supply orders           Relevant Orders    Positive Airway Pressure (PAP) Therapy       Disposition    Return to clinic in 12 months

## 2024-07-21 DIAGNOSIS — E87.6 LOW BLOOD POTASSIUM: ICD-10-CM

## 2024-07-22 DIAGNOSIS — E87.6 LOW BLOOD POTASSIUM: ICD-10-CM

## 2024-07-22 RX ORDER — POTASSIUM CHLORIDE 750 MG/1
10 TABLET, FILM COATED, EXTENDED RELEASE ORAL DAILY
Qty: 90 TABLET | Refills: 3 | Status: SHIPPED | OUTPATIENT
Start: 2024-07-22 | End: 2025-07-22

## 2024-07-22 RX ORDER — POTASSIUM CHLORIDE 750 MG/1
10 TABLET, EXTENDED RELEASE ORAL DAILY
Qty: 90 TABLET | Refills: 0 | Status: SHIPPED | OUTPATIENT
Start: 2024-07-22 | End: 2024-07-22 | Stop reason: SDUPTHER

## 2024-08-29 DIAGNOSIS — M43.10 ACQUIRED SPONDYLOLISTHESIS: ICD-10-CM

## 2024-08-29 RX ORDER — GABAPENTIN 600 MG/1
600 TABLET ORAL 3 TIMES DAILY
Qty: 90 TABLET | Refills: 1 | Status: SHIPPED | OUTPATIENT
Start: 2024-08-29

## 2024-09-14 DIAGNOSIS — I10 HYPERTENSION: ICD-10-CM

## 2024-09-14 RX ORDER — CARVEDILOL 12.5 MG/1
TABLET ORAL
Qty: 180 TABLET | Refills: 1 | Status: SHIPPED | OUTPATIENT
Start: 2024-09-14

## 2024-09-23 ENCOUNTER — APPOINTMENT (OUTPATIENT)
Dept: PRIMARY CARE | Facility: CLINIC | Age: 64
End: 2024-09-23
Payer: MEDICAID

## 2024-09-23 VITALS
WEIGHT: 205 LBS | OXYGEN SATURATION: 97 % | SYSTOLIC BLOOD PRESSURE: 126 MMHG | DIASTOLIC BLOOD PRESSURE: 79 MMHG | RESPIRATION RATE: 16 BRPM | HEART RATE: 72 BPM | BODY MASS INDEX: 35.19 KG/M2

## 2024-09-23 DIAGNOSIS — Z00.00 ROUTINE GENERAL MEDICAL EXAMINATION AT A HEALTH CARE FACILITY: Primary | ICD-10-CM

## 2024-09-23 DIAGNOSIS — E11.9 DIABETES MELLITUS WITHOUT COMPLICATION (MULTI): ICD-10-CM

## 2024-09-23 DIAGNOSIS — Z12.31 VISIT FOR SCREENING MAMMOGRAM: ICD-10-CM

## 2024-09-23 PROCEDURE — 99396 PREV VISIT EST AGE 40-64: CPT | Performed by: FAMILY MEDICINE

## 2024-09-23 PROCEDURE — 3078F DIAST BP <80 MM HG: CPT | Performed by: FAMILY MEDICINE

## 2024-09-23 PROCEDURE — 3074F SYST BP LT 130 MM HG: CPT | Performed by: FAMILY MEDICINE

## 2024-09-23 PROCEDURE — 3049F LDL-C 100-129 MG/DL: CPT | Performed by: FAMILY MEDICINE

## 2024-09-23 NOTE — PROGRESS NOTES
Subjective     Patient ID: Heaven Alfaro is a 64 y.o. female who presents for Follow-up (Med check and refills).      Last Physical : 1 Years ago     Pt's PMH, PSH, SH, FH , meds and allergies was obtained / reviewed and updated .     Dental visits : Y  Vision issues : N  Hearing issues : N    Immunizations : Y    Diet :  could be better  Exercise:  Weight concerns :     Alcohol: as noted in SH  Tobacco: as noted in SH  Recreational drug use : None/ as noted in SH    Sexually active : Active   Contraception :   Menstrual problems:  Premenopausal/perimenopausal/ postmenopausal:    G:  Parity:  Full term:    Premature:   (s):   Living :  Ab induced:   Ab spontaneous :  Ectopic :   Multiple :    PAP smear :  Mammogram :  Colonoscopy:    Metabolic screening   - Lipid   - Glucose  ======================================================    Visit Vitals  /79   Pulse 72   Resp 16   Wt 93 kg (205 lb)   SpO2 97%   BMI 35.19 kg/m²   OB Status Postmenopausal   Smoking Status Never   BSA 2.05 m²      No LMP recorded. Patient is postmenopausal.     =====================================    Review of systems:  Constitutional: no chills, no fever and no night sweats.     Eyes: no blurred vision and no eyesight problems.     ENT: no hearing loss, no nasal congestion, no nasal discharge, no hoarseness and no sore throat.     Cardiovascular: no chest pain, no intermittent leg claudication, no lower extremity edema, no palpitations and no syncope.     Respiratory: no cough, no shortness of breath during exertion, no shortness of breath at rest and no wheezing.     Gastrointestinal: no abdominal pain, no blood in stools, no constipation, no diarrhea, no melena, no nausea, no rectal pain and no vomiting.     Genitourinary: no dysuria, no change in urinary frequency, no urinary hesitancy, no feelings of urinary urgency and no vaginal discharge.     Musculoskeletal: no arthralgias, no back pain and no myalgias.      Integumentary: no new skin lesions and no rashes.     Neurological: no difficulty walking, no headache, no limb weakness, no numbness and no tingling.     Psychiatric: no anxiety, no depression, no anhedonia and no substance use disorders.     Endocrine: no recent weight gain and no recent weight loss.     Hematologic/Lymphatic: no tendency for easy bruising and no swollen glands.   ============================================================    Physical exam :    Constitutional: Alert and in no acute distress. Well developed, well nourished.     Eyes: Normal external exam. Pupils were equal in size, round, reactive to light (PERRL) with normal accommodation and extraocular movements intact (EOMI).     Ears, Nose, Mouth, and Throat: External inspection of ears and nose: Normal.  Otoscopic examination: Normal.      Neck: No neck mass was observed. Supple.     Cardiovascular: Heart rate and rhythm were normal, normal S1 and S2, no gallops, no murmurs and no pericardial rub    Pulmonary: No respiratory distress. Clear bilateral breath sounds.     Abdomen: Soft nontender; no abdominal mass palpated. No organomegaly.     Musculoskeletal: No joint swelling seen, normal movements of all extremities. Range of motion: Normal.  Muscle strength/tone: Normal.      Skin: Normal skin color and pigmentation, normal skin turgor, and no rash.     Neurologic: Deep tendon reflexes were 2+ and symmetric. Sensation: Normal.     Psychiatric: Judgment and insight: Intact. Mood and affect: Normal.    Lymphatic : Cervical/ axillary/ groin Lns Palpable/ non palpable            All other systems have been reviewed and are negative for complaint.      Assessment/Plan    Problem List Items Addressed This Visit             ICD-10-CM    Diabetes mellitus without complication (Multi) E11.9    Relevant Medications    semaglutide (Rybelsus) 3 mg tablet    Other Relevant Orders    Follow Up In [facility] - Pharmacy    Routine general  medical examination at a health care facility - Primary Z00.00     -Nutrition: Stressed importance of moderation in sodium/caffeine intake, saturated fat and cholesterol, caloric balance, sufficient intake of fresh fruits, vegetables, fiber, ---Exercise: Stressed the importance of regular exercise.   --Injury prevention: Discussed safety belts, safety helmets, smoke detector, smoking near bedding or upholstery.   --Dental health: Discussed importance of regular tooth brushing, flossing, and dental visits.  --Immunizations reviewed.  --Discussed benefits of screening colonoscopy.  --After hours service discussed with patient           Visit for screening mammogram Z12.31    Relevant Orders    BI mammo bilateral screening tomosynthesis

## 2024-09-24 PROCEDURE — RXMED WILLOW AMBULATORY MEDICATION CHARGE

## 2024-09-27 ENCOUNTER — TELEPHONE (OUTPATIENT)
Dept: PRIMARY CARE | Facility: CLINIC | Age: 64
End: 2024-09-27
Payer: COMMERCIAL

## 2024-10-03 ENCOUNTER — PHARMACY VISIT (OUTPATIENT)
Dept: PHARMACY | Facility: CLINIC | Age: 64
End: 2024-10-03
Payer: COMMERCIAL

## 2024-10-04 ENCOUNTER — PATIENT MESSAGE (OUTPATIENT)
Dept: PRIMARY CARE | Facility: CLINIC | Age: 64
End: 2024-10-04
Payer: COMMERCIAL

## 2024-10-14 DIAGNOSIS — E11.9 CONTROLLED TYPE 2 DIABETES MELLITUS WITHOUT COMPLICATION, WITHOUT LONG-TERM CURRENT USE OF INSULIN (MULTI): ICD-10-CM

## 2024-10-14 RX ORDER — ALOGLIPTIN 25 MG/1
25 TABLET, FILM COATED ORAL DAILY
Qty: 90 TABLET | Refills: 0 | Status: SHIPPED | OUTPATIENT
Start: 2024-10-14 | End: 2024-10-17

## 2024-10-15 ENCOUNTER — APPOINTMENT (OUTPATIENT)
Dept: PHARMACY | Facility: HOSPITAL | Age: 64
End: 2024-10-15
Payer: COMMERCIAL

## 2024-10-15 DIAGNOSIS — E11.9 DIABETES MELLITUS WITHOUT COMPLICATION (MULTI): ICD-10-CM

## 2024-10-15 RX ORDER — LANOLIN ALCOHOL/MO/W.PET/CERES
1000 CREAM (GRAM) TOPICAL DAILY
COMMUNITY

## 2024-10-15 NOTE — PROGRESS NOTES
Clinical Pharmacy Appointment    Patient ID: Heaven Alfaro is a 64 y.o. female who presents for First appointment.     Referring Provider: Vern Miranda MD  PCP: Vern Miranda MD   Last visit with PCP: 9/23/24   Next visit with PCP: 12/23/24    Subjective     DIABETES MELLITUS TYPE II:      Current diabetic medications include:  Rybelsus 3 mg daily  Metformin  mg BID    Alogliptin 25 mg daily     Clarifications to above regimen: Rybelsus started one week ago  Adverse Effects: a little nausea with Rybelsus    Past diabetic medications include:  Jardiance - yeast infections    Glucose Readings:  Glucometer/CGM Type: One Touch Ultra   Patient tests BG 2 times per day - FBG and evening glucose     Current home BG readings: Average AM -130 mg/dL. PM - 120 mg/dL    Any episodes of hypoglycemia? No, none reported .  Did patient treat episode of hypoglycemia appropriately? N/A. Has gone down to the 80's before and felt tipsy.     Lifestyle:  Diet: 2-3 meals/day. Tries to eat healthy. Has met with a nutritionist before and found the information helpful.   BK: multigrain cherrios, martínez or sausage with one egg (boiled or omelette)  LN: sometimes skips. Enjoys turkey sandwich with cheese.   DN: meatloaf with rice and stanley beans  Snacks: yogurt with fruit. Granola bar.  Drinks: water or green tea.   Physical Activity: Stays active at home. Walks around her neighborhood.     Secondary Prevention:  Statin? Yes - Simvastatin 40 mg daily   ACE-I/ARB? Yes - losartan-hydrochlorothiazide 100-12.5 mg daily   Aspirin? No    Pertinent PMH Review:  PMH of Pancreatitis: No  PMH of Retinopathy: Yes  PMH of Urinary Tract Infections: Yes  PMH of MTC: No  PMH of Obesity: Yes  PMH of ASCVD: Risk factors   PMH of CKD: No   PMH of CHF: No    Medication Reconciliation:  Changed: None   Added: vitamin B12  Discontinued: None     Drug Interactions  No relevant drug interactions were noted.    Medication System  Management  Patient's preferred pharmacy: [facility]  Adherence/Organization: No issues   Affordability/Accessibility: $20 per month for Rybelsus.     Objective   Allergies   Allergen Reactions    Chocolate Flavor Unknown    Oxycodone-Acetaminophen Other    Propoxyphene N-Acetaminophen Other     Social History     Social History Narrative    Not on file      Medication Review  Current Outpatient Medications   Medication Instructions    albuterol (Ventolin HFA) 90 mcg/actuation inhaler 2 puffs, inhalation, Every 4 hours PRN    Allergy Relief (fexofenadine) 180 mg, oral, Daily PRN    alogliptin (NESINA) 25 mg, oral, Daily    carvedilol (Coreg) 12.5 mg tablet TAKE 1 TABLET(12.5 MG) BY MOUTH TWICE DAILY IN THE MORNING AND LATE AFTERNOON    cyanocobalamin (VITAMIN B-12) 1,000 mcg, oral, Daily    estradiol (Estrace) 0.01 % (0.1 mg/gram) vaginal cream APPLY A PEA-SIZED AMOUNT TO urethral and vagina EVERY EVENING FOR TWO WEEKS, THEN DECREASE TO EVERY MONDAY, WEDNESDAY AND FRIDAY THEREAFTER    fluticasone (Flonase) 50 mcg/actuation nasal spray 2 sprays, Daily, Shake liquid prior to use.    gabapentin (NEURONTIN) 600 mg, oral, 3 times daily    ibuprofen 600 mg tablet TAKE 1 TABLET BY MOUTH EVERY MORNING, EVERY EVENING AND EVERY NIGHT AT BEDTIME    losartan-hydrochlorothiazide (Hyzaar) 100-12.5 mg tablet 1 tablet, oral, Daily    melatonin 3 mg tablet Take 1 tablet (3 mg) by mouth as needed at bedtime for sleep.    metFORMIN  mg 24 hr tablet 1 tablet (500 mg) 2 times daily (morning and late afternoon).    moisturizing mouth (Biotene Moisturizing Mouth) solution USE AS DIRECTED AS A SALIVA SUBSTITUTE    OneTouch Ultra Test strip USE 1 STRIP DAILY TO TEST BLOOD SUGAR    potassium chloride CR 10 mEq ER tablet 10 mEq, oral, Daily    Rybelsus 3 mg, oral, Daily    simvastatin (ZOCOR) 40 mg, oral, Daily      Vitals  BP Readings from Last 2 Encounters:   09/23/24 126/79   07/19/24 136/83     BMI Readings from Last 1  Encounters:   09/23/24 35.19 kg/m²      Labs  A1C  Lab Results   Component Value Date    HGBA1C 7.1 (A) 06/18/2024    HGBA1C 7.0 (A) 02/22/2024    HGBA1C 6.9 (A) 11/16/2023     BMP  Lab Results   Component Value Date    CALCIUM 9.9 06/18/2024     06/18/2024    K 3.8 06/18/2024    CO2 23 06/18/2024     (H) 06/18/2024    BUN 20 06/18/2024    CREATININE 0.73 06/18/2024    EGFR >90 06/18/2024     LFTs  Lab Results   Component Value Date    ALT 21 06/18/2024    AST 19 06/18/2024    ALKPHOS 53 06/18/2024    BILITOT 0.8 06/18/2024     FLP  Lab Results   Component Value Date    TRIG 98 06/18/2024    CHOL 163 06/18/2024    LDLF 81 08/17/2023    LDLCALC 104 (H) 06/18/2024    HDL 39.5 06/18/2024     Urine Microalbumin  Lab Results   Component Value Date    MICROALBCREA 6.9 08/17/2023     Weight Management  Wt Readings from Last 3 Encounters:   09/23/24 93 kg (205 lb)   07/19/24 92 kg (202 lb 12.8 oz)   06/18/24 90.8 kg (200 lb 3.2 oz)      There is no height or weight on file to calculate BMI.     Assessment/Plan   Problem List Items Addressed This Visit       Diabetes mellitus without complication (Multi)     Patient's goal A1c is < 7.0%.  Is pt at goal? most recent A1c on 6/18/24 of 7.1%. Prior A1c was 7.0% on 2/22/24.     Rationale for plan: Patient's SMBGs are near goal per patient reported levels.  Most recent A1c is suggestive of stable BG levels. Current medications include metformin, oral semaglutide and alogliptin. Patient reports that she has been on Rybelsus for about one week. States that she has some nausea but reports that symptoms are tolerable at this time. Of note, patient is on both a GLP1 and DPP4. These medications have overlapping mechanisms of action and are considered to be a duplication of therapy. Patient reports that she only has a few days left of alogliptin. Advised patient to discontinue therapy and advised against picking up additional refills. Patient verbalized understanding. Will  consider addition of new therapy in the future if needed for BG control, although additional medication may not be required if Rybelsus titration is tolerated. Will evaluate for dose increase of Rybelsus in 2 weeks.     Medication Changes:  CONTINUE:  Rybelsus 3 mg daily  Metformin  mg BID    STOP  Alogliptin 25 mg daily     Monitoring and Education:  Counseled patient on Rybelsus MOA, expectations, side effects, duration of therapy, administration, and monitoring parameters.   Discussed administering greater than 30 minutes before first food, beverage, or other medications in the morning.   Reviewed Rybelsus titration schedule, starting with 3mg once daily for 30 days, then increase to 7 mg once daily; may increase to 14 mg once daily after 30 days on the 7 mg dose if needed to achieve glycemic goals.  If there is a missed dose, then this dose should be skipped; resume at the next scheduled dose.  Counseled patient on the benefits of GLP-1ra, such as cardiovascular risk reduction, glycemic control, and weight loss potential.  Advised patient that they may experience improved satiety after meals and portion sizes of meals may be reduced as doses of Rybelsus increase.  Counseled patient to avoid foods that are fatty/oily as this may precipitate the nausea/GI upset that may occur with new start Rybelsus    Clinical Pharmacist follow-up: 10/29/24 @ 4 pm, Telehealth visit    Continue all meds under the continuation of care with the referring provider and clinical pharmacy team.    Thank you,  Minerva De La O, PharmD  Clinical Pharmacist  [phone]    Verbal consent to manage patient's drug therapy was obtained from the patient. They were informed they may decline to participate or withdraw from participation in pharmacy services at any time.

## 2024-10-29 ENCOUNTER — APPOINTMENT (OUTPATIENT)
Dept: PHARMACY | Facility: HOSPITAL | Age: 64
End: 2024-10-29
Payer: MEDICAID

## 2024-10-29 DIAGNOSIS — E11.9 DIABETES MELLITUS WITHOUT COMPLICATION (MULTI): ICD-10-CM

## 2024-10-29 PROCEDURE — RXMED WILLOW AMBULATORY MEDICATION CHARGE

## 2024-11-04 DIAGNOSIS — M43.10 ACQUIRED SPONDYLOLISTHESIS: ICD-10-CM

## 2024-11-04 RX ORDER — GABAPENTIN 600 MG/1
600 TABLET ORAL 3 TIMES DAILY
Qty: 90 TABLET | Refills: 1 | Status: SHIPPED | OUTPATIENT
Start: 2024-11-04 | End: 2025-11-04

## 2024-11-09 ENCOUNTER — PHARMACY VISIT (OUTPATIENT)
Dept: PHARMACY | Facility: CLINIC | Age: 64
End: 2024-11-09
Payer: COMMERCIAL

## 2024-11-11 ENCOUNTER — APPOINTMENT (OUTPATIENT)
Dept: OTOLARYNGOLOGY | Facility: CLINIC | Age: 64
End: 2024-11-11
Payer: COMMERCIAL

## 2024-11-25 DIAGNOSIS — J30.9 ALLERGIC RHINITIS, UNSPECIFIED SEASONALITY, UNSPECIFIED TRIGGER: ICD-10-CM

## 2024-11-25 RX ORDER — IBUPROFEN 600 MG/1
TABLET ORAL
Qty: 270 TABLET | Refills: 1 | Status: SHIPPED | OUTPATIENT
Start: 2024-11-25

## 2024-11-26 ENCOUNTER — APPOINTMENT (OUTPATIENT)
Dept: PHARMACY | Facility: HOSPITAL | Age: 64
End: 2024-11-26
Payer: COMMERCIAL

## 2024-11-26 DIAGNOSIS — E11.9 DIABETES MELLITUS WITHOUT COMPLICATION (MULTI): ICD-10-CM

## 2024-11-26 PROCEDURE — RXMED WILLOW AMBULATORY MEDICATION CHARGE

## 2024-11-26 NOTE — PROGRESS NOTES
Clinical Pharmacy Appointment    Patient ID: Heaven Alfaro is a 64 y.o. female who presents for Follow Up appointment.     Referring Provider: Vern Miranda MD  PCP: Vern Miranda MD   Last visit with PCP: 9/23/24   Next visit with PCP: 12/23/24    Subjective     DIABETES MELLITUS TYPE II:    Known diabetic complications: None.  Does patient follow with Endocrinology: No       Current diabetic medications include:  Rybelsus 3 mg daily   Metformin  mg BID     Adverse Effects: less of appetite and occasional nausea    Past diabetic medications include:  Alogliptin - stopped due to transition to Rybelsus   Jardiance - yeast infection    Glucose Readings:  Glucometer/CGM Type: One Touch Ultra   Patient tests BG 2 times per day - FBG and evening glucose     Current home BG readings: 125-130 mg/dL      Any episodes of hypoglycemia? No, none reported .  Did patient treat episode of hypoglycemia appropriately? N/A    Lifestyle:  Diet: 2-3 meals/day. Tries to eat healthy. Has met with a nutritionist before and found the information helpful.   BK: multigrain cherrios, martínez or sausage with one egg (boiled or omelette)  LN: sometimes skips. Enjoys turkey sandwich with cheese.   DN: meatloaf with rice and stanley beans  Snacks: yogurt with fruit. Granola bar.  Drinks: water or green tea.   Physical Activity: Stays active at home. Walks around her neighborhood.     Secondary Prevention:  Statin? Yes - Simvastatin 40 mg daily   ACE-I/ARB? Yes - losartan-hydrochlorothiazide 100-12.5 mg daily   Aspirin? No     Pertinent PMH Review:  PMH of Pancreatitis: No  PMH of Retinopathy: Yes  PMH of Urinary Tract Infections: Yes  PMH of MTC: No  PMH of Obesity: Yes  PMH of ASCVD: Risk factors   PMH of CKD: No   PMH of CHF: No     Medication Reconciliation:  No changes     Drug Interactions  No relevant drug interactions were noted.     Medication System Management  Patient's preferred pharmacy: [facility]  Adherence/Organization: No issues   Affordability/Accessibility: $20 per month for Rybelsus.       Objective   Allergies   Allergen Reactions    Chocolate Flavor Unknown    Oxycodone-Acetaminophen Other    Propoxyphene N-Acetaminophen Other     Social History     Social History Narrative    Not on file      Medication Review  Current Outpatient Medications   Medication Instructions    albuterol (Ventolin HFA) 90 mcg/actuation inhaler 2 puffs, inhalation, Every 4 hours PRN    Allergy Relief (fexofenadine) 180 mg, oral, Daily PRN    carvedilol (Coreg) 12.5 mg tablet TAKE 1 TABLET(12.5 MG) BY MOUTH TWICE DAILY IN THE MORNING AND LATE AFTERNOON    cyanocobalamin (VITAMIN B-12) 1,000 mcg, oral, Daily    estradiol (Estrace) 0.01 % (0.1 mg/gram) vaginal cream APPLY A PEA-SIZED AMOUNT TO urethral and vagina EVERY EVENING FOR TWO WEEKS, THEN DECREASE TO EVERY MONDAY, WEDNESDAY AND FRIDAY THEREAFTER    fluticasone (Flonase) 50 mcg/actuation nasal spray 2 sprays, Daily, Shake liquid prior to use.    gabapentin (NEURONTIN) 600 mg, oral, 3 times daily    ibuprofen 600 mg tablet TAKE 1 TABLET BY MOUTH EVERY MORNING, EVERY EVENING AND EVERY NIGHT AT BEDTIME    losartan-hydrochlorothiazide (Hyzaar) 100-12.5 mg tablet 1 tablet, oral, Daily    melatonin 3 mg tablet Take 1 tablet (3 mg) by mouth as needed at bedtime for sleep.    metFORMIN  mg 24 hr tablet 1 tablet (500 mg) 2 times daily (morning and late afternoon).    moisturizing mouth (Biotene Moisturizing Mouth) solution USE AS DIRECTED AS A SALIVA SUBSTITUTE    OneTouch Ultra Test strip USE 1 STRIP DAILY TO TEST BLOOD SUGAR    potassium chloride CR 10 mEq ER tablet 10 mEq, oral, Daily    semaglutide (RYBELSUS) 7 mg, oral, Daily    simvastatin (ZOCOR) 40 mg, oral, Daily      Vitals  BP Readings from Last 2 Encounters:   09/23/24 126/79   07/19/24 136/83     BMI Readings from Last 1 Encounters:   09/23/24 35.19 kg/m²      Labs  A1C  Lab Results   Component Value  Date    HGBA1C 7.1 (A) 06/18/2024    HGBA1C 7.0 (A) 02/22/2024    HGBA1C 6.9 (A) 11/16/2023     BMP  Lab Results   Component Value Date    CALCIUM 9.9 06/18/2024     06/18/2024    K 3.8 06/18/2024    CO2 23 06/18/2024     (H) 06/18/2024    BUN 20 06/18/2024    CREATININE 0.73 06/18/2024    EGFR >90 06/18/2024     LFTs  Lab Results   Component Value Date    ALT 21 06/18/2024    AST 19 06/18/2024    ALKPHOS 53 06/18/2024    BILITOT 0.8 06/18/2024     FLP  Lab Results   Component Value Date    TRIG 98 06/18/2024    CHOL 163 06/18/2024    LDLF 81 08/17/2023    LDLCALC 104 (H) 06/18/2024    HDL 39.5 06/18/2024     Urine Microalbumin  Lab Results   Component Value Date    MICROALBCREA 6.9 08/17/2023     Weight Management  Wt Readings from Last 3 Encounters:   09/23/24 93 kg (205 lb)   07/19/24 92 kg (202 lb 12.8 oz)   06/18/24 90.8 kg (200 lb 3.2 oz)      There is no height or weight on file to calculate BMI.     Assessment/Plan   Problem List Items Addressed This Visit       Diabetes mellitus without complication (Multi)    Relevant Medications    semaglutide (Rybelsus) 7 mg tablet    Other Relevant Orders    Referral to Clinical Pharmacy     Patient's goal A1c is < 7.0%.  Is pt at goal? most recent A1c on 6/18/24 of 7.1%. Prior A1c was 7.0% on 2/22/24.      Rationale for plan: Patient's SMBGs are near goal per patient reported levels. Most recent A1c is suggestive of stable BG levels. Patient has completed almost 8 weeks of Rybelsus 3 mg once daily and reports continued morning nausea. Patient reports that nausea improves within about 30 minutes and does not recur throughout the day. Discussed risk of increased nausea if Rybelsus dose is increased. Also discussed alternative therapies including injectable GLP1 options. Given PMH of obesity and presence of ASCVD risk factors, patient is an ideal candidate for GLP1 therapy. Patient opted to increase oral Rybelsus to 7 mg once daily. If unable to tolerate, will  consider transition to injectable therapy at next visit. Will follow up in 3 weeks to assess tolerability on higher dose.     Medication Changes:  CONTINUE:  Metformin  mg BID    INCREASE  Rybelsus 7 mg once daily     Monitoring and Education:  -Counseled patient on Rybelsus MOA, expectations, side effects, duration of therapy, administration, and monitoring parameters.   -Discussed administering greater than 30 minutes before first food, beverage, or other medications in the morning.   -Reviewed Rybelsus titration schedule, starting with 3mg once daily for 30 days, then increase to 7 mg once daily; may increase to 14 mg once daily after 30 days on the 7 mg dose if needed to achieve glycemic goals.  -If there is a missed dose, then this dose should be skipped; resume at the next scheduled dose.  -Counseled patient on the benefits of GLP-1ra, such as cardiovascular risk reduction, glycemic control, and weight loss potential.  -Advised patient that they may experience improved satiety after meals and portion sizes of meals may be reduced as doses of Rybelsus increase.  -Counseled patient to avoid foods that are fatty/oily as this may precipitate the nausea/GI upset that may occur with new start Rybelsus    Clinical Pharmacist follow-up: 12/18/24 @ 4 pm, Telehealth visit    Continue all meds under the continuation of care with the referring provider and clinical pharmacy team.    Thank you,  Minerva De La O, PharmD  Clinical Pharmacist  [phone]    Verbal consent to manage patient's drug therapy was obtained from the patient. They were informed they may decline to participate or withdraw from participation in pharmacy services at any time.

## 2024-12-02 ENCOUNTER — PHARMACY VISIT (OUTPATIENT)
Dept: PHARMACY | Facility: CLINIC | Age: 64
End: 2024-12-02
Payer: COMMERCIAL

## 2024-12-02 ENCOUNTER — HOSPITAL ENCOUNTER (OUTPATIENT)
Dept: RADIOLOGY | Facility: CLINIC | Age: 64
Discharge: HOME | End: 2024-12-02
Payer: COMMERCIAL

## 2024-12-02 VITALS — WEIGHT: 205.03 LBS | BODY MASS INDEX: 35 KG/M2 | HEIGHT: 64 IN

## 2024-12-02 DIAGNOSIS — Z12.31 VISIT FOR SCREENING MAMMOGRAM: ICD-10-CM

## 2024-12-02 PROCEDURE — 77067 SCR MAMMO BI INCL CAD: CPT

## 2024-12-02 PROCEDURE — 77067 SCR MAMMO BI INCL CAD: CPT | Performed by: RADIOLOGY

## 2024-12-02 PROCEDURE — 77063 BREAST TOMOSYNTHESIS BI: CPT | Performed by: RADIOLOGY

## 2024-12-04 ENCOUNTER — PATIENT MESSAGE (OUTPATIENT)
Dept: PRIMARY CARE | Facility: CLINIC | Age: 64
End: 2024-12-04
Payer: COMMERCIAL

## 2024-12-04 DIAGNOSIS — I10 PRIMARY HYPERTENSION: ICD-10-CM

## 2024-12-04 RX ORDER — LOSARTAN POTASSIUM AND HYDROCHLOROTHIAZIDE 12.5; 1 MG/1; MG/1
1 TABLET ORAL DAILY
Qty: 90 TABLET | Refills: 1 | Status: SHIPPED | OUTPATIENT
Start: 2024-12-04 | End: 2024-12-05 | Stop reason: SDUPTHER

## 2024-12-05 DIAGNOSIS — I10 PRIMARY HYPERTENSION: ICD-10-CM

## 2024-12-05 RX ORDER — LOSARTAN POTASSIUM AND HYDROCHLOROTHIAZIDE 12.5; 1 MG/1; MG/1
1 TABLET ORAL DAILY
Qty: 90 TABLET | Refills: 1 | Status: SHIPPED | OUTPATIENT
Start: 2024-12-05

## 2024-12-16 DIAGNOSIS — E11.9 CONTROLLED TYPE 2 DIABETES MELLITUS WITHOUT COMPLICATION, WITHOUT LONG-TERM CURRENT USE OF INSULIN (MULTI): ICD-10-CM

## 2024-12-16 DIAGNOSIS — E11.9 CONTROLLED TYPE 2 DIABETES MELLITUS WITHOUT COMPLICATION, UNSPECIFIED WHETHER LONG TERM INSULIN USE (MULTI): ICD-10-CM

## 2024-12-16 DIAGNOSIS — I10 PRIMARY HYPERTENSION: ICD-10-CM

## 2024-12-16 DIAGNOSIS — I10 HYPERTENSION: ICD-10-CM

## 2024-12-16 PROCEDURE — RXMED WILLOW AMBULATORY MEDICATION CHARGE

## 2024-12-16 RX ORDER — CARVEDILOL 12.5 MG/1
12.5 TABLET ORAL 2 TIMES DAILY
Qty: 180 TABLET | Refills: 1 | Status: SHIPPED | OUTPATIENT
Start: 2024-12-16

## 2024-12-16 RX ORDER — BLOOD SUGAR DIAGNOSTIC
STRIP MISCELLANEOUS
Qty: 100 STRIP | Refills: 1 | Status: SHIPPED | OUTPATIENT
Start: 2024-12-16

## 2024-12-16 RX ORDER — METFORMIN HYDROCHLORIDE 500 MG/1
TABLET, EXTENDED RELEASE ORAL
Qty: 180 TABLET | Refills: 1 | Status: SHIPPED | OUTPATIENT
Start: 2024-12-16

## 2024-12-18 ENCOUNTER — PHARMACY VISIT (OUTPATIENT)
Dept: PHARMACY | Facility: CLINIC | Age: 64
End: 2024-12-18
Payer: COMMERCIAL

## 2024-12-18 ENCOUNTER — APPOINTMENT (OUTPATIENT)
Dept: PHARMACY | Facility: HOSPITAL | Age: 64
End: 2024-12-18
Payer: COMMERCIAL

## 2024-12-18 DIAGNOSIS — E11.9 DIABETES MELLITUS WITHOUT COMPLICATION (MULTI): ICD-10-CM

## 2024-12-18 PROCEDURE — RXMED WILLOW AMBULATORY MEDICATION CHARGE

## 2024-12-18 NOTE — PROGRESS NOTES
Clinical Pharmacy Appointment    Patient ID: Heaven Alfaro is a 64 y.o. female who presents for Follow Up appointment.     Referring Provider: Vern Miranda MD  PCP: Vern Mrianda MD   Last visit with PCP: 9/23/24   Next visit with PCP: 12/23/24    Subjective     DIABETES MELLITUS TYPE II:    Known diabetic complications: None.  Does patient follow with Endocrinology: No       Current diabetic medications include:  Rybelsus 7 mg daily (started Monday)  Metformin  mg BID     Adverse Effects: less of appetite and occasional nausea    Past diabetic medications include:  Alogliptin - stopped due to transition to Rybelsus   Jardiance - yeast infection    Glucose Readings:  Glucometer/CGM Type: One Touch Ultra   Patient tests BG 2 times per day - FBG and evening glucose     Current home BG readings: Mostly 100-130's mg/dL      Any episodes of hypoglycemia? No, none reported .  Did patient treat episode of hypoglycemia appropriately? N/A    Lifestyle:  Diet: 2-3 meals/day. Tries to eat healthy. Has met with a nutritionist before and found the information helpful. Some reduction in appetite.   BK: multigrain cherrios, martínez or sausage with one egg (boiled or omelette)  LN: sometimes skips. Enjoys turkey sandwich with cheese.   DN: meatloaf with rice and stanley beans  Snacks: yogurt with fruit. Granola bar.  Drinks: water or green tea.   Physical Activity: Stays active at home. Walks around her neighborhood.     Secondary Prevention:  Statin? Yes - Simvastatin 40 mg daily   ACE-I/ARB? Yes - losartan-hydrochlorothiazide 100-12.5 mg daily   Aspirin? No     Pertinent PMH Review:  PMH of Pancreatitis: No  PMH of Retinopathy: Yes  PMH of Urinary Tract Infections: Yes  PMH of MTC: No  PMH of Obesity: Yes  PMH of ASCVD: Risk factors   PMH of CKD: No   PMH of CHF: No     Medication Reconciliation:  No changes     Drug Interactions  No relevant drug interactions were noted.     Medication System Management  Patient's  preferred pharmacy: [facility]  Adherence/Organization: No issues   Affordability/Accessibility: $20 per month for Rybelsus.   Enrolled in [employer]    Objective   Allergies   Allergen Reactions    Chocolate Flavor Unknown    Oxycodone-Acetaminophen Other    Propoxyphene N-Acetaminophen Other     Social History     Social History Narrative    Not on file      Medication Review  Current Outpatient Medications   Medication Instructions    albuterol (Ventolin HFA) 90 mcg/actuation inhaler 2 puffs, inhalation, Every 4 hours PRN    Allergy Relief (fexofenadine) 180 mg, oral, Daily PRN    blood sugar diagnostic (OneTouch Ultra Test) strip USE 1 STRIP DAILY TO TEST BLOOD SUGAR    carvedilol (COREG) 12.5 mg, oral, 2 times daily    cyanocobalamin (VITAMIN B-12) 1,000 mcg, oral, Daily    estradiol (Estrace) 0.01 % (0.1 mg/gram) vaginal cream APPLY A PEA-SIZED AMOUNT TO urethral and vagina EVERY EVENING FOR TWO WEEKS, THEN DECREASE TO EVERY MONDAY, WEDNESDAY AND FRIDAY THEREAFTER    fluticasone (Flonase) 50 mcg/actuation nasal spray 2 sprays, Daily, Shake liquid prior to use.    gabapentin (NEURONTIN) 600 mg, oral, 3 times daily    ibuprofen 600 mg tablet TAKE 1 TABLET BY MOUTH EVERY MORNING, EVERY EVENING AND EVERY NIGHT AT BEDTIME    losartan-hydrochlorothiazide (Hyzaar) 100-12.5 mg tablet 1 tablet, oral, Daily    melatonin 3 mg tablet Take 1 tablet (3 mg) by mouth as needed at bedtime for sleep.    metFORMIN  mg 24 hr tablet 1 tablet (500 mg) 2 times daily (morning and late afternoon).    moisturizing mouth (Biotene Moisturizing Mouth) solution USE AS DIRECTED AS A SALIVA SUBSTITUTE    potassium chloride CR 10 mEq ER tablet 10 mEq, oral, Daily    Rybelsus 7 mg, oral, Daily    simvastatin (ZOCOR) 40 mg, oral, Daily      Vitals  BP Readings from Last 2 Encounters:   09/23/24 126/79   07/19/24 136/83     BMI Readings from Last 1 Encounters:   12/02/24 35.18 kg/m²      Labs  A1C  Lab Results   Component Value Date     HGBA1C 7.1 (A) 06/18/2024    HGBA1C 7.0 (A) 02/22/2024    HGBA1C 6.9 (A) 11/16/2023     BMP  Lab Results   Component Value Date    CALCIUM 9.9 06/18/2024     06/18/2024    K 3.8 06/18/2024    CO2 23 06/18/2024     (H) 06/18/2024    BUN 20 06/18/2024    CREATININE 0.73 06/18/2024    EGFR >90 06/18/2024     LFTs  Lab Results   Component Value Date    ALT 21 06/18/2024    AST 19 06/18/2024    ALKPHOS 53 06/18/2024    BILITOT 0.8 06/18/2024     FLP  Lab Results   Component Value Date    TRIG 98 06/18/2024    CHOL 163 06/18/2024    LDLF 81 08/17/2023    LDLCALC 104 (H) 06/18/2024    HDL 39.5 06/18/2024     Urine Microalbumin  Lab Results   Component Value Date    MICROALBCREA 6.9 08/17/2023     Weight Management  Wt Readings from Last 3 Encounters:   12/02/24 93 kg (205 lb 0.4 oz)   09/23/24 93 kg (205 lb)   07/19/24 92 kg (202 lb 12.8 oz)      There is no height or weight on file to calculate BMI.     Assessment/Plan   Problem List Items Addressed This Visit       Diabetes mellitus without complication (Multi)     Patient's goal A1c is < 7.0%.  Is pt at goal? most recent A1c on 6/18/24 of 7.1%. Prior A1c was 7.0% on 2/22/24.      Rationale for plan: Patient's SMBGs are at goal per patient reported levels. Most recent A1c is suggestive of stable BG levels. Current regimen includes Metformin  mg BID and Rybelsus 7 mg once daily. Patient has completed 4 days of Rybelsus 7 mg and reports reduced morning nausea. Patient previously reported that nausea was bothersome and occurred nearly daily. States that nausea has improved significantly and she is doing better on higher dos. Denies any other concerns at this time. Advised patient to continue current regimen. Will follow up in one month to assess need for further dose adjustments.     Medication Changes: None   CONTINUE:  Metformin  mg BID    Rybelsus 7 mg once daily     Monitoring and Education:  -Counseled patient on Rybelsus MOA, expectations, side  effects, duration of therapy, administration, and monitoring parameters.   -Discussed administering greater than 30 minutes before first food, beverage, or other medications in the morning.   -Reviewed Rybelsus titration schedule, starting with 3mg once daily for 30 days, then increase to 7 mg once daily; may increase to 14 mg once daily after 30 days on the 7 mg dose if needed to achieve glycemic goals.  -If there is a missed dose, then this dose should be skipped; resume at the next scheduled dose.  -Counseled patient on the benefits of GLP-1ra, such as cardiovascular risk reduction, glycemic control, and weight loss potential.  -Advised patient that they may experience improved satiety after meals and portion sizes of meals may be reduced as doses of Rybelsus increase.  -Counseled patient to avoid foods that are fatty/oily as this may precipitate the nausea/GI upset that may occur with new start Rybelsus    Clinical Pharmacist follow-up: 1/15/25 @ 4 pm, Telehealth visit    Continue all meds under the continuation of care with the referring provider and clinical pharmacy team.    Thank you,  Minerva De La O, PharmD  Clinical Pharmacist  [phone]    Verbal consent to manage patient's drug therapy was obtained from the patient. They were informed they may decline to participate or withdraw from participation in pharmacy services at any time.

## 2024-12-22 ENCOUNTER — PHARMACY VISIT (OUTPATIENT)
Dept: PHARMACY | Facility: CLINIC | Age: 64
End: 2024-12-22
Payer: COMMERCIAL

## 2024-12-23 ENCOUNTER — APPOINTMENT (OUTPATIENT)
Dept: PRIMARY CARE | Facility: CLINIC | Age: 64
End: 2024-12-23
Payer: COMMERCIAL

## 2024-12-23 VITALS
BODY MASS INDEX: 33.29 KG/M2 | SYSTOLIC BLOOD PRESSURE: 128 MMHG | WEIGHT: 195 LBS | HEART RATE: 80 BPM | DIASTOLIC BLOOD PRESSURE: 82 MMHG | HEIGHT: 64 IN

## 2024-12-23 DIAGNOSIS — S39.012A STRAIN OF LUMBAR REGION, INITIAL ENCOUNTER: ICD-10-CM

## 2024-12-23 DIAGNOSIS — S16.1XXA STRAIN OF NECK MUSCLE, INITIAL ENCOUNTER: ICD-10-CM

## 2024-12-23 DIAGNOSIS — E78.00 PURE HYPERCHOLESTEROLEMIA: ICD-10-CM
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
## 2024-12-23 DIAGNOSIS — M54.16 LUMBAR RADICULOPATHY: ICD-10-CM

## 2024-12-23 DIAGNOSIS — M43.10 ACQUIRED SPONDYLOLISTHESIS: ICD-10-CM

## 2024-12-23 DIAGNOSIS — E11.9 CONTROLLED TYPE 2 DIABETES MELLITUS WITHOUT COMPLICATION, WITHOUT LONG-TERM CURRENT USE OF INSULIN (MULTI): ICD-10-CM

## 2024-12-23 DIAGNOSIS — I10 PRIMARY HYPERTENSION: ICD-10-CM

## 2024-12-23 LAB — POC HEMOGLOBIN A1C: 6.8 % (ref 4.2–6.5)

## 2024-12-23 PROCEDURE — 3074F SYST BP LT 130 MM HG: CPT | Performed by: FAMILY MEDICINE

## 2024-12-23 PROCEDURE — 83036 HEMOGLOBIN GLYCOSYLATED A1C: CPT | Performed by: FAMILY MEDICINE

## 2024-12-23 PROCEDURE — 3079F DIAST BP 80-89 MM HG: CPT | Performed by: FAMILY MEDICINE

## 2024-12-23 PROCEDURE — 99214 OFFICE O/P EST MOD 30 MIN: CPT | Performed by: FAMILY MEDICINE

## 2024-12-23 PROCEDURE — 3008F BODY MASS INDEX DOCD: CPT | Performed by: FAMILY MEDICINE

## 2024-12-23 RX ORDER — GABAPENTIN 600 MG/1
600 TABLET ORAL 3 TIMES DAILY
Qty: 90 TABLET | Refills: 1 | Status: SHIPPED | OUTPATIENT
Start: 2024-12-23 | End: 2025-12-23

## 2024-12-23 RX ORDER — METHOCARBAMOL 750 MG/1
750 TABLET, FILM COATED ORAL 3 TIMES DAILY
Qty: 45 TABLET | Refills: 0 | Status: SHIPPED | OUTPATIENT
Start: 2024-12-23 | End: 2025-01-03 | Stop reason: SDUPTHER

## 2024-12-23 RX ORDER — PREDNISONE 20 MG/1
20 TABLET ORAL 2 TIMES DAILY
Qty: 14 TABLET | Refills: 0 | Status: SHIPPED | OUTPATIENT
Start: 2024-12-23 | End: 2024-12-30

## 2024-12-23 RX ORDER — LIDOCAINE 50 MG/G
1 PATCH TOPICAL DAILY
Qty: 30 PATCH | Refills: 1 | Status: SHIPPED | OUTPATIENT
Start: 2024-12-23 | End: 2025-01-22

## 2024-12-23 ASSESSMENT — ENCOUNTER SYMPTOMS
OCCASIONAL FEELINGS OF UNSTEADINESS: 0
LOSS OF SENSATION IN FEET: 0
DEPRESSION: 0
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
## 2024-12-26 ENCOUNTER — HOSPITAL ENCOUNTER (OUTPATIENT)
Dept: RADIOLOGY | Facility: HOSPITAL | Age: 64
Discharge: HOME | End: 2024-12-26
Payer: COMMERCIAL

## 2024-12-26 DIAGNOSIS — S39.012A STRAIN OF LUMBAR REGION, INITIAL ENCOUNTER: ICD-10-CM
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
## 2024-12-26 PROCEDURE — 72100 X-RAY EXAM L-S SPINE 2/3 VWS: CPT

## 2024-12-29 PROBLEM — S16.1XXA CERVICAL STRAIN: Status: ACTIVE | Noted: 2024-12-29

## 2024-12-29 PROBLEM — S39.012A STRAIN OF LUMBAR REGION: Status: ACTIVE | Noted: 2024-12-29
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
## 2024-12-29 ASSESSMENT — ENCOUNTER SYMPTOMS
NUMBNESS: 1
BACK PAIN: 1
FEVER: 0
CARDIOVASCULAR NEGATIVE: 1
ALLERGIC/IMMUNOLOGIC NEGATIVE: 1
MYALGIAS: 1
VOMITING: 0
NECK PAIN: 1
ARTHRALGIAS: 1
ENDOCRINE NEGATIVE: 1
NECK STIFFNESS: 1
GASTROINTESTINAL NEGATIVE: 1
NAUSEA: 0
DIZZINESS: 1
NERVOUS/ANXIOUS: 1
HEADACHES: 1
CHILLS: 0
HEMATOLOGIC/LYMPHATIC NEGATIVE: 1
RESPIRATORY NEGATIVE: 1

## 2024-12-29 NOTE — ASSESSMENT & PLAN NOTE
BP Readings from Last 1 Encounters:   12/23/24 128/82     - doing well, asymptomatic  - discussed at length the impact of untreated MOLLY and BP control  - continue current management and follow-up with PCP

## 2025-01-03 ENCOUNTER — PATIENT MESSAGE (OUTPATIENT)
Dept: PRIMARY CARE | Facility: CLINIC | Age: 65
End: 2025-01-03
Payer: COMMERCIAL
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
## 2025-01-03 DIAGNOSIS — S39.012A STRAIN OF LUMBAR REGION, INITIAL ENCOUNTER: ICD-10-CM

## 2025-01-03 DIAGNOSIS — M43.17 ACQUIRED SPONDYLOLISTHESIS OF LUMBOSACRAL REGION: Primary | ICD-10-CM

## 2025-01-03 DIAGNOSIS — S16.1XXA STRAIN OF NECK MUSCLE, INITIAL ENCOUNTER: ICD-10-CM

## 2025-01-03 RX ORDER — METHOCARBAMOL 750 MG/1
750 TABLET, FILM COATED ORAL 3 TIMES DAILY
Qty: 45 TABLET | Refills: 0 | Status: SHIPPED | OUTPATIENT
Start: 2025-01-03 | End: 2025-01-18

## 2025-01-05 NOTE — ASSESSMENT & PLAN NOTE
X-ray showed worsening spondylolisthesis possibly acute recommend getting an MRI due to increased pain and trauma

## 2025-01-12 DIAGNOSIS — E11.9 CONTROLLED TYPE 2 DIABETES MELLITUS WITHOUT COMPLICATION, UNSPECIFIED WHETHER LONG TERM INSULIN USE (MULTI): ICD-10-CM

## 2025-01-12 DIAGNOSIS — I10 PRIMARY HYPERTENSION: ICD-10-CM

## 2025-01-12 DIAGNOSIS — J30.9 ALLERGIC RHINITIS, UNSPECIFIED SEASONALITY, UNSPECIFIED TRIGGER: ICD-10-CM

## 2025-01-12 RX ORDER — IBUPROFEN 600 MG/1
TABLET ORAL
Qty: 270 TABLET | Refills: 1 | Status: SHIPPED | OUTPATIENT
Start: 2025-01-12

## 2025-01-15 ENCOUNTER — APPOINTMENT (OUTPATIENT)
Dept: PHARMACY | Facility: HOSPITAL | Age: 65
End: 2025-01-15
Payer: COMMERCIAL

## 2025-01-15 DIAGNOSIS — E11.9 DIABETES MELLITUS WITHOUT COMPLICATION (MULTI): ICD-10-CM

## 2025-01-16 ENCOUNTER — OFFICE VISIT (OUTPATIENT)
Dept: PAIN MEDICINE | Facility: HOSPITAL | Age: 65
End: 2025-01-16
Payer: COMMERCIAL

## 2025-01-16 DIAGNOSIS — M54.16 LUMBAR RADICULOPATHY: Primary | ICD-10-CM

## 2025-01-16 DIAGNOSIS — M54.16 LUMBAR RADICULOPATHY: ICD-10-CM

## 2025-01-16 DIAGNOSIS — M48.062 SPINAL STENOSIS OF LUMBAR REGION WITH NEUROGENIC CLAUDICATION: ICD-10-CM

## 2025-01-16 PROCEDURE — 99214 OFFICE O/P EST MOD 30 MIN: CPT | Performed by: ANESTHESIOLOGY
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
## 2025-01-20 ENCOUNTER — APPOINTMENT (OUTPATIENT)
Dept: RADIOLOGY | Facility: CLINIC | Age: 65
End: 2025-01-20
Payer: COMMERCIAL

## 2025-01-22 DIAGNOSIS — E11.9 CONTROLLED TYPE 2 DIABETES MELLITUS WITHOUT COMPLICATION, UNSPECIFIED WHETHER LONG TERM INSULIN USE (MULTI): ICD-10-CM

## 2025-01-23 RX ORDER — SIMVASTATIN 40 MG/1
40 TABLET, FILM COATED ORAL DAILY
Qty: 90 TABLET | Refills: 1 | Status: SHIPPED | OUTPATIENT
Start: 2025-01-23

## 2025-01-24 ENCOUNTER — PHARMACY VISIT (OUTPATIENT)
Dept: PHARMACY | Facility: CLINIC | Age: 65
End: 2025-01-24
Payer: COMMERCIAL

## 2025-01-24 PROCEDURE — RXMED WILLOW AMBULATORY MEDICATION CHARGE

## 2025-01-28 ENCOUNTER — HOSPITAL ENCOUNTER (OUTPATIENT)
Dept: RADIOLOGY | Facility: CLINIC | Age: 65
Discharge: HOME | End: 2025-01-28
Payer: COMMERCIAL
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
## 2025-01-28 DIAGNOSIS — M43.17 ACQUIRED SPONDYLOLISTHESIS OF LUMBOSACRAL REGION: ICD-10-CM

## 2025-01-28 PROCEDURE — 72158 MRI LUMBAR SPINE W/O & W/DYE: CPT | Performed by: RADIOLOGY

## 2025-01-28 PROCEDURE — 72158 MRI LUMBAR SPINE W/O & W/DYE: CPT

## 2025-01-28 PROCEDURE — A9575 INJ GADOTERATE MEGLUMI 0.1ML: HCPCS | Performed by: FAMILY MEDICINE

## 2025-01-28 PROCEDURE — 2550000001 HC RX 255 CONTRASTS: Performed by: FAMILY MEDICINE

## 2025-01-28 RX ORDER — GADOTERATE MEGLUMINE 376.9 MG/ML
18 INJECTION INTRAVENOUS
Status: COMPLETED | OUTPATIENT
Start: 2025-01-28 | End: 2025-01-28

## 2025-01-28 RX ADMIN — GADOTERATE MEGLUMINE 18 ML: 376.9 INJECTION INTRAVENOUS at 17:02

## 2025-01-31 ENCOUNTER — APPOINTMENT (OUTPATIENT)
Dept: OTOLARYNGOLOGY | Facility: CLINIC | Age: 65
End: 2025-01-31
Payer: COMMERCIAL

## 2025-01-31 ENCOUNTER — PATIENT MESSAGE (OUTPATIENT)
Dept: PRIMARY CARE | Facility: CLINIC | Age: 65
End: 2025-01-31

## 2025-01-31 VITALS — WEIGHT: 181 LBS | BODY MASS INDEX: 31.07 KG/M2

## 2025-01-31 DIAGNOSIS — I10 PRIMARY HYPERTENSION: ICD-10-CM

## 2025-01-31 DIAGNOSIS — J34.2 DEVIATED SEPTUM: ICD-10-CM

## 2025-01-31 DIAGNOSIS — R09.82 PND (POST-NASAL DRIP): ICD-10-CM

## 2025-01-31 DIAGNOSIS — J31.0 CHRONIC RHINITIS: Primary | ICD-10-CM

## 2025-01-31 PROCEDURE — 99203 OFFICE O/P NEW LOW 30 MIN: CPT | Performed by: GENERAL PRACTICE

## 2025-01-31 RX ORDER — LOSARTAN POTASSIUM AND HYDROCHLOROTHIAZIDE 12.5; 1 MG/1; MG/1
1 TABLET ORAL DAILY
Qty: 90 TABLET | Refills: 1 | Status: SHIPPED | OUTPATIENT
Start: 2025-01-31

## 2025-01-31 RX ORDER — TRIAMCINOLONE ACETONIDE 55 UG/1
2 SPRAY, METERED NASAL DAILY
Qty: 16.5 G | Refills: 2 | Status: SHIPPED | OUTPATIENT
Start: 2025-01-31

## 2025-01-31 RX ORDER — MUPIROCIN 20 MG/G
OINTMENT TOPICAL 2 TIMES DAILY
Qty: 22 G | Refills: 0 | Status: SHIPPED | OUTPATIENT
Start: 2025-01-31 | End: 2025-02-14

## 2025-02-01 PROCEDURE — RXMED WILLOW AMBULATORY MEDICATION CHARGE

## 2025-02-02 NOTE — PROGRESS NOTES
Otolaryngology - Head and Neck Surgery Outpatient New Patient Visit Note        Assessment/Plan:   Problem List Items Addressed This Visit             ICD-10-CM       ENT    PND (post-nasal drip) R09.82     Other Visit Diagnoses         Codes    Chronic rhinitis    -  Primary J31.0    Relevant Medications    triamcinolone (Nasacort) 55 mcg nasal inhaler    mupirocin (Bactroban) 2 % ointment    Deviated septum     J34.2              64yoF with chronic rhinitis and deviated septum.   Dry rhinitis without evidence of sinusitis.   Will have pt use mupirocin BID for 2 weeks then saline gel  Replace flonase with nasacort.   Allegra PRN.    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  Discussed avoidance/masking as possible and use of saline irrigations after exposure.     RTC PRN.        Follow up:  -plan for follow up in clinic as needed    All of the above findings, impressions, treatment planning and follow up plans were discussed with the patient who indicated understanding.  the patient was instructed to contact or return to clinic sooner if symptoms/signs persist or worsen despite the above management.      Kashmir Durbin MD  Otolaryngology - Head and Neck Surgery            History Of Present Illness  Heaven Alfaro is a 64 y.o. female presenting for chronic congestion, rhinorrhea, PND.     Reports a history of allergic rhintis, using allegra/flonase with some effect  Notes intermittent sinus pressure/discomfort, but infrequent sinusitis  No nasal trauma/surgery.     Uses CPAP which contributes to nasal irritation.                 Past Medical History  She has a past medical history of Abnormal finding in urine (02/05/2023), Acute UTI (02/05/2023), Bacterial vaginosis (02/05/2023), Cataract, Diabetes mellitus (Multi), Dry eyes, Electrolyte imbalance (06/27/2012), Lattice degeneration of peripheral retina, right, Other conditions influencing health status, and Vaginal yeast infection  (02/05/2023).    Surgical History  She has a past surgical history that includes Hysterectomy; Other surgical history (05/04/2018); and Cataract extraction w/  intraocular lens implant (Right, 01/17/2019).     Social History  She reports that she has never smoked. She has never been exposed to tobacco smoke. She has never used smokeless tobacco. She reports current alcohol use. She reports that she does not use drugs.    Family History  Family History   Problem Relation Name Age of Onset    Hypertension Mother      Glaucoma Mother      Breast cancer Mother      Breast cancer Mother's Sister          Allergies  Chocolate flavor, Oxycodone-acetaminophen, and Propoxyphene n-acetaminophen    Review of Systems  ROS: Pertinent positives as noted in HPI.    - CONSTITUTIONAL: Does not report weight loss, fever or chills.    - HEENT:   Ear: Does not report tinnitus, vertigo, hearing loss, otalgia, otorrhea  Nose: Does not report  ,  , epistaxis, decreased smell  Throat: Does not report pain, dysphagia, odynophagia  Larynx: Does not report hoarseness,  difficulty breathing, pain with speaking (odynophonia)  Neck: Does not report new masses, pain, swelling  Face: Does not report sinus pain, pressure, swelling, numbness, weakness     - RESPIRATORY: Does not report SOB or cough.    - CV: Does not report palpitations or chest pain.     - GI: Does not report abdominal pain, nausea, vomiting or diarrhea.    - : Does not report dysuria or urinary frequency.    - MSK: Does not report myalgia or joint pain.    - SKIN: Does not report rash or pruritus.    - NEUROLOGICAL: Does not report headache or syncope.    - PSYCHIATRIC: Does not report recent changes in mood. Does not report anxiety or depression.         Physical Exam:     GENERAL:   Alert & Oriented to person, place and time; Normal affect and appearance. Well developed and well nourished. Conversant & cooperative with examination.     HEAD:   Normocephalic, atraumatic. No  sinus tenderness to palpation. Normal parotid bilaterally. Normal facial strength.     NEUROLOGIC:   Cranial nerves II-XII grossly intact, gait WNL. Normal mood and affect.    EYES:   Extraocular movements intact. Pupils equal, round, reactive to light and accommodation. No nystagmus, no ptosis. no scleral injection.    EAR:   Normal auricle. No discomfort or TTP with manipulation.   Handheld otoscopic exam showed normal external auditory canals bilaterally. No purulence or EAC inflammation. Minimal cerumen.   Right tympanic membrane clear and mobile without evidence of perforation, retraction or middle ear effusion.   Left tympanic membrane clear and mobile without evidence of perforation, retraction or middle ear effusion.     NOSE:   No external deformity. No external nasal lesions, lacerations, or scars. Nasal tip symmetrical with normal nasal valves.   Nasal cavity with deviated  septum, erythematous/irritated mucosa with mucoid debris.  Otherwise normal  mucosa and turbinates. No lesions, masses, purulence or polyps.     OC/OP:   Mucous membranes moist, no masses, lesions or exudates.   Normal tongue, floor of mouth, teeth, gums, lips. Normal posterior pharyngeal wall.    Normal tonsils without erythema, exudate or obvious calculi     NECK:   No neck masses or thyroid enlargement. Trachea midline. No tenderness to palpation    LYMPHATIC:   No cervical lymphadenopathy.     RESPIRATORY:   Symmetric chest elevation & no retractions. No significant hoarseness. No increased work of breathing.    CV:   No clubbing or cyanosis. No obvious edema    Skin:   No facial rashes, vesicles or lesions.     Extremities:   No gross abnormalities      Clinic Procedure        Information review:  External sources (notes, imaging, lab results) listed below personally reviewed to aid in medical decision making process.  -  -  -

## 2025-02-02 NOTE — H&P
Pain Management H&P    History Of Present Illness  Heaven Alfaro is a 64 y.o. female presents for procedure state below. Endorses no changes in past medical history or medical health since last seen in clinic.      Past Medical History  She has a past medical history of Abnormal finding in urine (02/05/2023), Acute UTI (02/05/2023), Bacterial vaginosis (02/05/2023), Cataract, Diabetes mellitus (Multi), Dry eyes, Electrolyte imbalance (06/27/2012), Lattice degeneration of peripheral retina, right, Other conditions influencing health status, and Vaginal yeast infection (02/05/2023).    Surgical History  She has a past surgical history that includes Hysterectomy; Other surgical history (05/04/2018); and Cataract extraction w/  intraocular lens implant (Right, 01/17/2019).     Social History  She reports that she has never smoked. She has never been exposed to tobacco smoke. She has never used smokeless tobacco. She reports current alcohol use. She reports that she does not use drugs.    Family History  Family History   Problem Relation Name Age of Onset    Hypertension Mother      Glaucoma Mother      Breast cancer Mother      Breast cancer Mother's Sister          Allergies  Chocolate flavor, Oxycodone-acetaminophen, and Propoxyphene n-acetaminophen    Review of Symptoms:   Constitutional: Negative for chills, diaphoresis or fever  HENT: Negative for neck swelling  Eyes:.  Negative for eye pain  Respiratory:.  Negative for cough, shortness of breath or wheezing    Cardiovascular:.  Negative for chest pain or palpitations  Gastrointestinal:.  Negative for abdominal pain, nausea and vomiting  Genitourinary:.  Negative for urgency  Musculoskeletal:  Positive for back pain. Positive for joint pain. Denies falls within the past 3 months.  Skin: Negative for wounds or itching   Neurological: Negative for dizziness, seizures, loss of consciousness and weakness  Endo/Heme/Allergies: Does not bruise/bleed  easily  Psychiatric/Behavioral: Negative for depression. The patient does not appear anxious.      Pre-sedation Evaluation  ASA class 2  Mallampati score 2     PHYSICAL EXAM  Vitals signs reviewed  Constitutional:       General: Not in acute distress     Appearance: Normal appearance. Not ill-appearing.  HENT:     Head: Normocephalic and atraumatic  Eyes:     Conjunctiva/sclera: Conjunctivae normal  Cardiovascular:     Rate and Rhythm: Normal rate and regular rhythm  Pulmonary:     Effort: No respiratory distress  Abdominal:     Palpations: Abdomen is soft  Musculoskeletal: SMITH  Skin:     General: Skin is warm and dry  Neurological:     General: No focal deficit present  Psychiatric:         Mood and Affect: Mood normal         Behavior: Behavior normal     Last Recorded Vitals  There were no vitals taken for this visit.    Relevant Results  Current Outpatient Medications   Medication Instructions    albuterol (Ventolin HFA) 90 mcg/actuation inhaler 2 puffs, inhalation, Every 4 hours PRN    Allergy Relief (fexofenadine) 180 mg, oral, Daily PRN    blood sugar diagnostic (OneTouch Ultra Test) strip USE 1 STRIP DAILY TO TEST BLOOD SUGAR    carvedilol (COREG) 12.5 mg, oral, 2 times daily    cyanocobalamin (VITAMIN B-12) 1,000 mcg, Daily    estradiol (Estrace) 0.01 % (0.1 mg/gram) vaginal cream APPLY A PEA-SIZED AMOUNT TO urethral and vagina EVERY EVENING FOR TWO WEEKS, THEN DECREASE TO EVERY MONDAY, WEDNESDAY AND FRIDAY THEREAFTER    fluticasone (Flonase) 50 mcg/actuation nasal spray 2 sprays, Daily, Shake liquid prior to use.    gabapentin (NEURONTIN) 600 mg, oral, 3 times daily    ibuprofen 600 mg tablet TAKE 1 TABLET BY MOUTH EVERY MORNING, EVERY EVENING AND EVERY NIGHT AT BEDTIME    losartan-hydrochlorothiazide (Hyzaar) 100-12.5 mg tablet 1 tablet, oral, Daily    melatonin 3 mg tablet Take 1 tablet (3 mg) by mouth as needed at bedtime for sleep.    metFORMIN  mg 24 hr tablet 1 tablet (500 mg) 2 times daily  (morning and late afternoon).    methocarbamol (ROBAXIN) 750 mg, oral, 3 times daily    moisturizing mouth (Biotene Moisturizing Mouth) solution USE AS DIRECTED AS A SALIVA SUBSTITUTE    mupirocin (Bactroban) 2 % ointment Topical, 2 times daily    potassium chloride CR 10 mEq ER tablet 10 mEq, oral, Daily    Rybelsus 7 mg, oral, Daily    simvastatin (ZOCOR) 40 mg, oral, Daily    triamcinolone (Nasacort) 55 mcg nasal inhaler 2 sprays, Each Nostril, Daily       No results found for this or any previous visit from the past 1000 days.     No image results found.       No diagnosis found.     ASSESSMENT/PLAN  Heaven Alfaro is a 64 y.o. female here for bilateral L4 transforaminal epidural steroid injection under fluoroscopy    Patient denies any recent antibiotic use or infections, denies any blood thinner use, and denies contrast or local anesthetic allergies     Risks, benefits, alternatives discussed. All questions answered to the best of my ability. Patient agrees to proceed.      Our plan is as follows:  - Proceed with aforementioned procedure          Fantasma Scott MD   Pain fellow

## 2025-02-03 ENCOUNTER — HOSPITAL ENCOUNTER (OUTPATIENT)
Facility: HOSPITAL | Age: 65
Discharge: HOME | End: 2025-02-03
Payer: COMMERCIAL

## 2025-02-03 VITALS
SYSTOLIC BLOOD PRESSURE: 114 MMHG | TEMPERATURE: 98.1 F | DIASTOLIC BLOOD PRESSURE: 74 MMHG | HEART RATE: 67 BPM | RESPIRATION RATE: 16 BRPM | OXYGEN SATURATION: 98 %

## 2025-02-03 DIAGNOSIS — M54.16 LUMBAR RADICULOPATHY: ICD-10-CM

## 2025-02-03 PROCEDURE — 64483 NJX AA&/STRD TFRM EPI L/S 1: CPT | Mod: 50 | Performed by: ANESTHESIOLOGY

## 2025-02-03 PROCEDURE — 2500000005 HC RX 250 GENERAL PHARMACY W/O HCPCS: Performed by: ANESTHESIOLOGY

## 2025-02-03 PROCEDURE — 2550000001 HC RX 255 CONTRASTS: Performed by: ANESTHESIOLOGY

## 2025-02-03 PROCEDURE — 64483 NJX AA&/STRD TFRM EPI L/S 1: CPT | Performed by: ANESTHESIOLOGY

## 2025-02-03 PROCEDURE — 2720000007 HC OR 272 NO HCPCS

## 2025-02-03 PROCEDURE — 2500000004 HC RX 250 GENERAL PHARMACY W/ HCPCS (ALT 636 FOR OP/ED): Performed by: ANESTHESIOLOGY

## 2025-02-03 RX ORDER — DEXAMETHASONE SODIUM PHOSPHATE 10 MG/ML
INJECTION INTRAMUSCULAR; INTRAVENOUS
Status: DISPENSED
Start: 2025-02-03 | End: 2025-02-03

## 2025-02-03 RX ORDER — DEXAMETHASONE SODIUM PHOSPHATE 10 MG/ML
INJECTION INTRAMUSCULAR; INTRAVENOUS
Status: COMPLETED | OUTPATIENT
Start: 2025-02-03 | End: 2025-02-03

## 2025-02-03 RX ORDER — LIDOCAINE HYDROCHLORIDE 5 MG/ML
INJECTION, SOLUTION INFILTRATION; INTRAVENOUS
Status: COMPLETED | OUTPATIENT
Start: 2025-02-03 | End: 2025-02-03

## 2025-02-03 RX ORDER — SODIUM CHLORIDE 9 MG/ML
INJECTION, SOLUTION INTRAMUSCULAR; INTRAVENOUS; SUBCUTANEOUS
Status: DISPENSED
Start: 2025-02-03 | End: 2025-02-03

## 2025-02-03 RX ORDER — LIDOCAINE HYDROCHLORIDE 5 MG/ML
INJECTION, SOLUTION INFILTRATION; INTRAVENOUS
Status: DISPENSED
Start: 2025-02-03 | End: 2025-02-03

## 2025-02-03 RX ORDER — SODIUM CHLORIDE 9 MG/ML
INJECTION, SOLUTION INTRAMUSCULAR; INTRAVENOUS; SUBCUTANEOUS
Status: COMPLETED | OUTPATIENT
Start: 2025-02-03 | End: 2025-02-03

## 2025-02-03 RX ADMIN — IOHEXOL 1 ML: 240 INJECTION, SOLUTION INTRATHECAL; INTRAVASCULAR; INTRAVENOUS; ORAL at 11:43

## 2025-02-03 RX ADMIN — LIDOCAINE HYDROCHLORIDE 8 ML: 5 INJECTION, SOLUTION INFILTRATION at 11:42

## 2025-02-03 RX ADMIN — DEXAMETHASONE SODIUM PHOSPHATE 10 MG: 10 INJECTION, SOLUTION INTRAMUSCULAR; INTRAVENOUS at 11:41

## 2025-02-03 RX ADMIN — SODIUM CHLORIDE 3 ML: 9 INJECTION INTRAMUSCULAR; INTRAVENOUS; SUBCUTANEOUS at 11:43

## 2025-02-03 ASSESSMENT — PAIN SCALES - GENERAL
PAINLEVEL_OUTOF10: 5 - MODERATE PAIN
PAINLEVEL_OUTOF10: 5 - MODERATE PAIN
PAINLEVEL_OUTOF10: 0 - NO PAIN
PAINLEVEL_OUTOF10: 5 - MODERATE PAIN
PAINLEVEL_OUTOF10: 5 - MODERATE PAIN

## 2025-02-03 ASSESSMENT — PAIN - FUNCTIONAL ASSESSMENT
PAIN_FUNCTIONAL_ASSESSMENT: 0-10
PAIN_FUNCTIONAL_ASSESSMENT: WONG-BAKER FACES
PAIN_FUNCTIONAL_ASSESSMENT: 0-10

## 2025-02-03 ASSESSMENT — COLUMBIA-SUICIDE SEVERITY RATING SCALE - C-SSRS
1. IN THE PAST MONTH, HAVE YOU WISHED YOU WERE DEAD OR WISHED YOU COULD GO TO SLEEP AND NOT WAKE UP?: NO
2. HAVE YOU ACTUALLY HAD ANY THOUGHTS OF KILLING YOURSELF?: NO
6. HAVE YOU EVER DONE ANYTHING, STARTED TO DO ANYTHING, OR PREPARED TO DO ANYTHING TO END YOUR LIFE?: NO

## 2025-02-03 NOTE — DISCHARGE INSTRUCTIONS
DISCHARGE INSTRUCTIONS FOR INJECTIONS     You underwent bilateral L4 transforaminal epidural steroid injection under fluoroscopy today    After most injections, it is recommended that you relax and limit your activity for the remainder of the day unless you have been told otherwise by your pain physician.  You should not drive a car, operate machinery, or make important legal decisions unless otherwise directed by your pain physician.  You may resume your normal activity, including exercise, tomorrow.      Keep a written pain diary of how much pain relief you experienced following the injection procedure and the length of time of pain relief you experienced pain relief. Following diagnostic injections like medial branch nerve blocks, sacroiliac joint blocks, stellate ganglion injections and other blocks, it is very important you record the specific amount of pain relief you experienced immediately after the injectionand how long it lasted. Your doctor will ask you for this information at your follow up visit.     For all injections, please keep the injection site dry and inspect the site for a couple of days. You may remove the Band-Aid the day of the injection at any time.     Some discomfort, bruising or slight swelling may occur at the injection site. This is not abnormal if it occurs.  If needed you may:    -Take over the counter medication such as Tylenol or Motrin.   -Apply an ice pack for 30 minutes, 2 to 3 times a day for the first 24 hours.     You may shower today; no soaking baths, hot tubs, whirlpools or swimming pools for two days.      If you are given steroids in your injection, it may take 3-5 days for the steroid medication to take effect. You may notice a worsening of your symptoms for 1-2 days after the injection. This is not abnormal.  You may use acetaminophen, ibuprofen, or prescription medication that your doctor may have prescribed for you if you need to do so.     A few common side effects  of steroids include facial flushing, sweating, restlessness, irritability,difficulty sleeping, increase in blood sugar, and increased blood pressure. If you have diabetes, please monitor your blood sugar at least once a day for at least 5 days. If you have poorly controlled high blood pressure, monitoryour blood pressure for at least 2 days and contact your primary care physician if these numbers are unusually high for you.      If you take aspirin or non-steroidal anti-inflammatory drugs (examples are Motrin, Advil, ibuprofen, Naprosyn, Voltaren, Relafen, etc.) you may restart these this evening, but stop taking it 3 days before your next appointment, unless instructed otherwiseby your physician.      You do not need to discontinue non-aspirin-containing pain medications prior to an injection (examples: Celebrex, tramadol, hydrocodone and acetaminophen).      If you take a blood thinning medication (Coumadin, Lovenox, Fragmin,Ticlid, Plavix, Pradaxa, etc.), please discuss this with your primary care physician/cardiologist and your pain physician. These medications MUST be discontinued before you can have an injection safely, without the risk of uncontrolled bleeding. If these medications are not discontinued for an appropriate period of time, you will not be able to receivean injection. Please adhere to instructions given to you about when to restart your blood thinning medication. If you have any questions please reach out to our team.    If you are taking Coumadin, please have your INR checked the morning of your procedure and bring the result to your appointment unless otherwise instructed. If your INR is over 1.2, your injection may need to be rescheduled to avoid uncontrolled bleeding from the needle placement.     Call [facility]  and ask for Pain Management at [phone] between 8am-4pm Monday - Friday if you are experiencing the following:    If you received an epidural or spinal injection:    -Headache  that doesnot go away with medicine, is worse when sitting or standing up, and is greatly relieved upon lying down.   -Severe pain worse than or different than your baseline pain.   -Chills or fever (101º F or greater).   -Drainage or signs of infection at the injection site     Go directly to the Emergency Department if you are experiencing the following and received an epidural or spinal injection:   -Abrupt weakness or progressive weakness in your legs that starts after you leave the clinic.   -Abrupt severe or worsening numbness in your legs.   -Inability to urinate after the injection or loss of bowel or bladder control without the urge to defecate or urinate.     If you have a clinical question that cannot wait until your next appointment, please call [phone] between 8am-4pm Monday - Friday or send a [employer] message. We do our best to return all non-emergency messages within 24 hours, Monday - Friday. A nurse or physician will return your message. You may also the appropriate nurse below, and they will do their best to answer your questions.  - [place]. [place], [place] [facility] [place] [phone]  - For Dr. Matta, call [facility] at [phone]  - For Dr. Titus, call [facility] at [phone]  - For Dr. Rojas, call [facility] at [phone]  - For Dr. Sherman, call [facility] at [phone]    If you need to cancel an appointment, please call the scheduling staff at [phone] during normal business hours or leave a message at least 24 hours in advance.     If you are going to be sedated for your next procedure, you MUST have responsible adult who can legally drive accompany you home. You cannot eat or drink for at least eight hours prior to the planned procedure if you are going to receive sedation. You may take your non-blood thinning medications with a small sip of water.

## 2025-02-04 ENCOUNTER — PHARMACY VISIT (OUTPATIENT)
Dept: PHARMACY | Facility: CLINIC | Age: 65
End: 2025-02-04
Payer: COMMERCIAL

## 2025-02-09 ENCOUNTER — PATIENT MESSAGE (OUTPATIENT)
Dept: PRIMARY CARE | Facility: CLINIC | Age: 65
End: 2025-02-09
Payer: COMMERCIAL

## 2025-02-09 DIAGNOSIS — I10 HYPERTENSION: ICD-10-CM

## 2025-02-09 RX ORDER — CARVEDILOL 12.5 MG/1
12.5 TABLET ORAL 2 TIMES DAILY
Qty: 180 TABLET | Refills: 1 | Status: SHIPPED | OUTPATIENT
Start: 2025-02-09

## 2025-02-10 DIAGNOSIS — J30.9 ALLERGIC RHINITIS, UNSPECIFIED SEASONALITY, UNSPECIFIED TRIGGER: ICD-10-CM

## 2025-02-10 PROCEDURE — RXMED WILLOW AMBULATORY MEDICATION CHARGE

## 2025-02-10 RX ORDER — IBUPROFEN 600 MG/1
TABLET ORAL
Qty: 270 TABLET | Refills: 1 | Status: SHIPPED | OUTPATIENT
Start: 2025-02-10

## 2025-02-11 ENCOUNTER — PHARMACY VISIT (OUTPATIENT)
Dept: PHARMACY | Facility: CLINIC | Age: 65
End: 2025-02-11
Payer: COMMERCIAL

## 2025-02-18 ENCOUNTER — OFFICE VISIT (OUTPATIENT)
Dept: CARDIOLOGY | Facility: HOSPITAL | Age: 65
End: 2025-02-18
Payer: COMMERCIAL

## 2025-02-18 VITALS
SYSTOLIC BLOOD PRESSURE: 102 MMHG | BODY MASS INDEX: 32.27 KG/M2 | DIASTOLIC BLOOD PRESSURE: 67 MMHG | HEIGHT: 64 IN | HEART RATE: 74 BPM | OXYGEN SATURATION: 98 % | WEIGHT: 189 LBS

## 2025-02-18 DIAGNOSIS — E78.00 PURE HYPERCHOLESTEROLEMIA: ICD-10-CM

## 2025-02-18 DIAGNOSIS — I10 HYPERTENSION, UNSPECIFIED TYPE: Primary | ICD-10-CM

## 2025-02-18 LAB
ATRIAL RATE: 74 BPM
P AXIS: 73 DEGREES
P OFFSET: 166 MS
P ONSET: 106 MS
PR INTERVAL: 224 MS
Q ONSET: 218 MS
QRS COUNT: 12 BEATS
QRS DURATION: 82 MS
QT INTERVAL: 388 MS
QTC CALCULATION(BAZETT): 430 MS
QTC FREDERICIA: 416 MS
R AXIS: 96 DEGREES
T AXIS: 56 DEGREES
T OFFSET: 412 MS
VENTRICULAR RATE: 74 BPM

## 2025-02-18 PROCEDURE — 1036F TOBACCO NON-USER: CPT | Performed by: NURSE PRACTITIONER

## 2025-02-18 PROCEDURE — 3008F BODY MASS INDEX DOCD: CPT | Performed by: NURSE PRACTITIONER

## 2025-02-18 PROCEDURE — 3078F DIAST BP <80 MM HG: CPT | Performed by: NURSE PRACTITIONER

## 2025-02-18 PROCEDURE — 93005 ELECTROCARDIOGRAM TRACING: CPT | Performed by: NURSE PRACTITIONER

## 2025-02-18 PROCEDURE — 3074F SYST BP LT 130 MM HG: CPT | Performed by: NURSE PRACTITIONER

## 2025-02-18 PROCEDURE — 99213 OFFICE O/P EST LOW 20 MIN: CPT | Performed by: NURSE PRACTITIONER

## 2025-02-18 PROCEDURE — 93010 ELECTROCARDIOGRAM REPORT: CPT | Performed by: INTERNAL MEDICINE

## 2025-02-18 NOTE — PROGRESS NOTES
Subjective   Heaven Alfaro is a 64 y.o. female.    Chief Complaint:  Hypertension and Hyperlipidemia    Mrs. Alfaro returns for her annual follow up. We follow her for risk factor management. She has been feeling well from a cardiac standpoint. She has remained compliant with her medications, denying any intolerances. Her biggest issue, and limiting factor is back pain. She denies any chest pain or shortness of breath at her current activity level. She denies any recent ER visits or hospitalizations. She offers no specific cardiovascular complaints or concerns today. She denies any complaints of chest pain, shortness of breath, lightheadedness, dizziness, palpitations, syncope, orthopnea, paroxysmal nocturnal dyspnea, lower extremity swelling or bleeding concerns.          Review of Systems   All other systems reviewed and are negative.      Objective   Physical Exam  Constitutional:       Appearance: Healthy appearance. In no distress  Pulmonary:      Effort: Pulmonary effort is normal.      Breath sounds: Normal breath sounds.   Cardiovascular:      Normal rate. Regular rhythm. Normal S1. Normal S2.       Murmurs: There is no murmur.      Carotids: right carotid pulse +2, no bruit heard over the right carotid. left carotid pulse +2, no bruit heard over the left carotid.  Edema:     Peripheral edema absent.   Abdominal:      Palpations: Abdomen is soft.   Musculoskeletal:       Cervical back: Normal range of motion.   Skin:     General: Skin is warm and dry. Normal color and pigmentation   Neurological:      Mental Status: Alert and oriented to person, place and time.   Psychiatric:     Mood and Affect: appropriate mood and appropriate affect.     EKG obtained and reviewed. Sinus rhythm with 1st degree AV block. HR 74      Lab Review:   Lab Results   Component Value Date     06/18/2024    K 3.8 06/18/2024     (H) 06/18/2024    CO2 23 06/18/2024    BUN 20 06/18/2024    CREATININE 0.73 06/18/2024    GLUCOSE  90 06/18/2024    CALCIUM 9.9 06/18/2024     Lab Results   Component Value Date    WBC 6.5 06/18/2024    HGB 12.9 06/18/2024    HCT 40.5 06/18/2024    MCV 88 06/18/2024     06/18/2024     Lab Results   Component Value Date    CHOL 163 06/18/2024    TRIG 98 06/18/2024    HDL 39.5 06/18/2024    LDLDIRECT 121 09/29/2022       Assessment/Plan   Mrs. Alfaro is a pleasant 64 year old  female with a past medical history significant for hypertension, hyperlipidemia, MOLLY compliant with CPAP and diabetes. She presents today for routine follow up stable from a cardiac standpoint. Her VS and EKG remain stable. She remains on appropriate risk factor reduction therapy. She seems to be getting around reasonably well other than back pain. I will have her continue all medications unchanged. She was encouraged to try and stay active. She will follow up with us in clinic in one year. She knows to call for any concerns.

## 2025-02-24 PROCEDURE — RXMED WILLOW AMBULATORY MEDICATION CHARGE

## 2025-02-25 ENCOUNTER — PHARMACY VISIT (OUTPATIENT)
Dept: PHARMACY | Facility: CLINIC | Age: 65
End: 2025-02-25
Payer: COMMERCIAL

## 2025-03-05 ENCOUNTER — EVALUATION (OUTPATIENT)
Dept: PHYSICAL THERAPY | Facility: CLINIC | Age: 65
End: 2025-03-05
Payer: COMMERCIAL

## 2025-03-05 DIAGNOSIS — M54.16 LUMBAR RADICULOPATHY: ICD-10-CM

## 2025-03-05 PROCEDURE — 97161 PT EVAL LOW COMPLEX 20 MIN: CPT | Mod: GP | Performed by: PHYSICAL THERAPIST

## 2025-03-05 PROCEDURE — 97110 THERAPEUTIC EXERCISES: CPT | Mod: GP | Performed by: PHYSICAL THERAPIST

## 2025-03-05 ASSESSMENT — ENCOUNTER SYMPTOMS
DEPRESSION: 0
OCCASIONAL FEELINGS OF UNSTEADINESS: 0
LOSS OF SENSATION IN FEET: 1
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
## 2025-03-14 ENCOUNTER — PATIENT MESSAGE (OUTPATIENT)
Dept: PRIMARY CARE | Facility: CLINIC | Age: 65
End: 2025-03-14
Payer: COMMERCIAL

## 2025-03-14 DIAGNOSIS — E87.6 LOW BLOOD POTASSIUM: ICD-10-CM

## 2025-03-14 RX ORDER — POTASSIUM CHLORIDE 750 MG/1
10 TABLET, FILM COATED, EXTENDED RELEASE ORAL DAILY
Qty: 90 TABLET | Refills: 3 | Status: SHIPPED | OUTPATIENT
Start: 2025-03-14 | End: 2026-03-14

## 2025-03-19 ENCOUNTER — APPOINTMENT (OUTPATIENT)
Dept: PHARMACY | Facility: HOSPITAL | Age: 65
End: 2025-03-19
Payer: COMMERCIAL

## 2025-03-20 DIAGNOSIS — E11.9 DIABETES MELLITUS WITHOUT COMPLICATION (MULTI): ICD-10-CM

## 2025-03-20 PROCEDURE — RXMED WILLOW AMBULATORY MEDICATION CHARGE

## 2025-03-21 ENCOUNTER — PHARMACY VISIT (OUTPATIENT)
Dept: PHARMACY | Facility: CLINIC | Age: 65
End: 2025-03-21
Payer: COMMERCIAL

## 2025-03-21 PROCEDURE — RXMED WILLOW AMBULATORY MEDICATION CHARGE

## 2025-03-24 ENCOUNTER — PHARMACY VISIT (OUTPATIENT)
Dept: PHARMACY | Facility: CLINIC | Age: 65
End: 2025-03-24

## 2025-03-24 ENCOUNTER — APPOINTMENT (OUTPATIENT)
Dept: PRIMARY CARE | Facility: CLINIC | Age: 65
End: 2025-03-24
Payer: COMMERCIAL

## 2025-03-24 VITALS
HEIGHT: 64 IN | DIASTOLIC BLOOD PRESSURE: 80 MMHG | BODY MASS INDEX: 32.44 KG/M2 | HEART RATE: 71 BPM | SYSTOLIC BLOOD PRESSURE: 129 MMHG | WEIGHT: 190 LBS

## 2025-03-24 DIAGNOSIS — E78.00 PURE HYPERCHOLESTEROLEMIA: ICD-10-CM

## 2025-03-24 DIAGNOSIS — E11.9 CONTROLLED TYPE 2 DIABETES MELLITUS WITHOUT COMPLICATION, WITHOUT LONG-TERM CURRENT USE OF INSULIN: ICD-10-CM

## 2025-03-24 DIAGNOSIS — I10 PRIMARY HYPERTENSION: ICD-10-CM

## 2025-03-24 DIAGNOSIS — E87.6 LOW BLOOD POTASSIUM: ICD-10-CM

## 2025-03-24 DIAGNOSIS — E11.9 CONTROLLED TYPE 2 DIABETES MELLITUS WITHOUT COMPLICATION, UNSPECIFIED WHETHER LONG TERM INSULIN USE: Primary | ICD-10-CM

## 2025-03-24 DIAGNOSIS — J30.9 ALLERGIC RHINITIS, UNSPECIFIED SEASONALITY, UNSPECIFIED TRIGGER: ICD-10-CM

## 2025-03-24 LAB — POC HEMOGLOBIN A1C: 6.8 % (ref 4.2–6.5)

## 2025-03-24 PROCEDURE — 3008F BODY MASS INDEX DOCD: CPT | Performed by: FAMILY MEDICINE

## 2025-03-24 PROCEDURE — 83036 HEMOGLOBIN GLYCOSYLATED A1C: CPT | Performed by: FAMILY MEDICINE

## 2025-03-24 PROCEDURE — 3079F DIAST BP 80-89 MM HG: CPT | Performed by: FAMILY MEDICINE

## 2025-03-24 PROCEDURE — 99214 OFFICE O/P EST MOD 30 MIN: CPT | Performed by: FAMILY MEDICINE

## 2025-03-24 PROCEDURE — RXMED WILLOW AMBULATORY MEDICATION CHARGE

## 2025-03-24 PROCEDURE — 3074F SYST BP LT 130 MM HG: CPT | Performed by: FAMILY MEDICINE

## 2025-03-24 RX ORDER — IBUPROFEN 600 MG/1
TABLET ORAL
Qty: 270 TABLET | Refills: 1 | Status: SHIPPED | OUTPATIENT
Start: 2025-03-24

## 2025-03-24 RX ORDER — POTASSIUM CHLORIDE 750 MG/1
10 TABLET, FILM COATED, EXTENDED RELEASE ORAL DAILY
Qty: 90 TABLET | Refills: 3 | Status: SHIPPED | OUTPATIENT
Start: 2025-03-24 | End: 2026-03-24

## 2025-03-24 RX ORDER — SIMVASTATIN 40 MG/1
40 TABLET, FILM COATED ORAL DAILY
Qty: 90 TABLET | Refills: 1 | Status: SHIPPED | OUTPATIENT
Start: 2025-03-24

## 2025-03-24 ASSESSMENT — ENCOUNTER SYMPTOMS
OCCASIONAL FEELINGS OF UNSTEADINESS: 0
DEPRESSION: 0
LOSS OF SENSATION IN FEET: 0

## 2025-03-25 ENCOUNTER — PHARMACY VISIT (OUTPATIENT)
Dept: PHARMACY | Facility: CLINIC | Age: 65
End: 2025-03-25
Payer: COMMERCIAL

## 2025-03-26 ENCOUNTER — PHARMACY VISIT (OUTPATIENT)
Dept: PHARMACY | Facility: CLINIC | Age: 65
End: 2025-03-26
Payer: COMMERCIAL

## 2025-03-29 ENCOUNTER — PATIENT MESSAGE (OUTPATIENT)
Dept: PRIMARY CARE | Facility: CLINIC | Age: 65
End: 2025-03-29
Payer: COMMERCIAL

## 2025-03-29 PROBLEM — E87.6 LOW BLOOD POTASSIUM: Status: ACTIVE | Noted: 2025-03-29

## 2025-03-29 ASSESSMENT — ENCOUNTER SYMPTOMS
VOMITING: 0
HEMATOLOGIC/LYMPHATIC NEGATIVE: 1
MYALGIAS: 1
GASTROINTESTINAL NEGATIVE: 1
CARDIOVASCULAR NEGATIVE: 1
FEVER: 0
NUMBNESS: 1
PSYCHIATRIC NEGATIVE: 1
BACK PAIN: 1
HYPERTENSION: 1
NAUSEA: 0
CHILLS: 0
ACTIVITY CHANGE: 1
ALLERGIC/IMMUNOLOGIC NEGATIVE: 1
ENDOCRINE NEGATIVE: 1
RESPIRATORY NEGATIVE: 1

## 2025-03-29 NOTE — ASSESSMENT & PLAN NOTE
BP Readings from Last 1 Encounters:   03/24/25 129/80     - doing well, asymptomatic  - discussed at length the impact of untreated MOLLY and BP control  - continue current management and follow-up
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
## 2025-03-31 ENCOUNTER — TREATMENT (OUTPATIENT)
Dept: PHYSICAL THERAPY | Facility: CLINIC | Age: 65
End: 2025-03-31
Payer: COMMERCIAL

## 2025-03-31 DIAGNOSIS — M54.16 LUMBAR RADICULOPATHY: ICD-10-CM

## 2025-03-31 DIAGNOSIS — M43.10 ACQUIRED SPONDYLOLISTHESIS: ICD-10-CM

## 2025-03-31 PROCEDURE — 97110 THERAPEUTIC EXERCISES: CPT | Mod: GP,CQ

## 2025-03-31 RX ORDER — GABAPENTIN 600 MG/1
600 TABLET ORAL 3 TIMES DAILY
Qty: 90 TABLET | Refills: 1 | Status: SHIPPED | OUTPATIENT
Start: 2025-03-31 | End: 2026-03-31

## 2025-03-31 ASSESSMENT — PAIN - FUNCTIONAL ASSESSMENT: PAIN_FUNCTIONAL_ASSESSMENT: 0-10

## 2025-03-31 ASSESSMENT — PAIN SCALES - GENERAL: PAINLEVEL_OUTOF10: 6

## 2025-03-31 NOTE — PROGRESS NOTES
Physical Therapy    Physical Therapy Treatment    Patient Name: Heaven Alfaro  MRN: [id number]  Today's Date: 3/31/2025  Time Entry:   Time Calculation  Start Time: 0230  Stop Time: 0315  Time Calculation (min): 45 min     PT Therapeutic Procedures Time Entry  Therapeutic Exercise Time Entry: 45                 Visit: 2  Insurance: Reviewed  Physician: Isabelle Campbell    Assessment:  PT Assessment  Evaluation/Treatment Tolerance: Patient tolerated treatment well  Prone ext on elbow and seated flx increased symptoms of feet tingling, seated ext slightly reduced. Symptoms abolished during supine activities; new and repeated supine/HL therex received well. Reports more left sided tightness during stretching.     Plan:   Continue with POC  Scifit stepper, back flexion exercises, PT sequence/stabilization, posture/body mechanics, HP    OP PT Plan  PT Plan: Skilled PT  PT Frequency: 1 time per week  Duration: 6  Onset Date: 12/23/24  Rehab Potential: Good  Plan of Care Agreement: Patient    Current Problem  1. Lumbar radiculopathy  Follow Up In Physical Therapy      General        Subjective    Pain currently is a 6/10 through my lower back, while I'm sitting here no radicular symptoms into my legs/feet  HEP completed every other day  No major changes since evaluation    Pain  Pain Assessment  Pain Assessment: 0-10  0-10 (Numeric) Pain Score: 6    Objective     Treatments:  Therapeutic Exercise  Therapeutic Exercise Performed: Yes  Seated flexions x 10 (increased)  Seated lumbars ext x 10 (decreased)  Prone on elbows x 1 min (increased)  LTR x 20  PT x 20  Hamstring stretching with strap active x 20  Dead bug x 10  HL piriformis stretch 20 x 5 sec  DKTC 10 x 5 sec    OP EDUCATION:       Goals:  Active       PT Problem       PT Goal 1       Start:  03/05/25    Expected End:  06/03/25       Decrease back and leg pain to 4/10         PT Goal 2       Start:  03/05/25    Expected End:  06/03/25       Improve LEFS by 20%          PT Goal 3       Start:  03/05/25    Expected End:  06/03/25       Maximize lumbar ROM         PT Goal 4       Start:  03/05/25    Expected End:  06/03/25       Good abdominal and back extensor strength

## 2025-04-01 ENCOUNTER — PATIENT MESSAGE (OUTPATIENT)
Dept: PRIMARY CARE | Facility: CLINIC | Age: 65
End: 2025-04-01
Payer: COMMERCIAL

## 2025-04-07 NOTE — PROGRESS NOTES
Clinical Pharmacy Appointment    Patient ID: Heaven Alfaro is a 64 y.o. female who presents for diabetes.     This is a Follow Up appointment.     Referring Provider: Vern Miranda MD  PCP: Vern Miranda MD   Last visit with PCP: 12/23/24  Next visit with PCP: 3/24/25    Subjective     DIABETES MELLITUS TYPE II:    Known diabetic complications: None.  Does patient follow with Endocrinology: No  Last optometry exam: 4/29/24; next visit 5/5/25   Most recent visit in Podiatry: Needs to schedule -- patient denies sores or cuts on feet today     Current diabetic medications include:  Rybelsus 7 mg daily   Metformin  mg BID     Adverse Effects: Nausea after taking that lasts about an hour    Past diabetic medications include:  Alogliptin - stopped due to transition to Rybelsus   Jardiance - yeast infection    Glucose Readings:  Glucometer/CGM Type: One Touch Ultra   Patient tests BG 2 times per day - FBG and evening glucose     Current home BG readings: Mostly 112 mg/dL, 113 mg/dL, 117 mg/dL, 114 mg/dL     Any episodes of hypoglycemia? No, none reported .  Did patient treat episode of hypoglycemia appropriately? N/A  Does the patient have a prescription for ready-to-use Glucagon? Not on insulin    Lifestyle:  Diet: 2-3 meals/day. Tries to eat healthy. Has met with a nutritionist before and found the information helpful. Some reduction in appetite.   BK: multigrain cherrios, martínez or sausage with one egg (boiled or omelette)  LN: sometimes skips. Enjoys turkey sandwich with cheese.   DN: meatloaf with rice and stanley beans  Snacks: yogurt with fruit. Granola bar.  Drinks: water or green tea.   Physical Activity: Stays active at home. Walks around her neighborhood.     Secondary Prevention:  Statin? Yes - Simvastatin 40 mg daily   ACE-I/ARB? Yes - losartan-hydrochlorothiazide 100-12.5 mg daily   Aspirin? No     Pertinent PMH Review:  PMH of Pancreatitis: No  PMH of Retinopathy: Yes  PMH of Urinary Tract Infections:  Yes  PMH of MTC: No  PMH of Obesity: Yes  PMH of ASCVD: Risk factors   PMH of CKD: No   PMH of CHF: No     Medication Reconciliation:  No changes     Drug Interactions  No relevant drug interactions were noted.     Medication System Management  [facility]  Adherence/Organization: No issues   Affordability/Accessibility: $20 per month for Rybelsus.   Enrolled in [employer]    Objective   Allergies   Allergen Reactions    Chocolate Flavor Unknown    Oxycodone-Acetaminophen Other    Propoxyphene N-Acetaminophen Other     Social History     Social History Narrative    Not on file      Medication Review  Current Outpatient Medications   Medication Instructions    albuterol (Ventolin HFA) 90 mcg/actuation inhaler 2 puffs, inhalation, Every 4 hours PRN    Allergy Relief (fexofenadine) 180 mg, oral, Daily PRN    blood sugar diagnostic (OneTouch Ultra Test) strip USE 1 STRIP DAILY TO TEST BLOOD SUGAR    carvedilol (COREG) 12.5 mg, oral, 2 times daily    cyanocobalamin (VITAMIN B-12) 1,000 mcg, Daily    estradiol (Estrace) 0.01 % (0.1 mg/gram) vaginal cream APPLY A PEA-SIZED AMOUNT TO urethral and vagina EVERY EVENING FOR TWO WEEKS, THEN DECREASE TO EVERY MONDAY, WEDNESDAY AND FRIDAY THEREAFTER    fluticasone (Flonase) 50 mcg/actuation nasal spray 2 sprays, Daily, Shake liquid prior to use.    gabapentin (NEURONTIN) 600 mg, oral, 3 times daily    ibuprofen 600 mg tablet TAKE 1 TABLET BY MOUTH EVERY MORNING, EVERY EVENING AND EVERY NIGHT AT BEDTIME    losartan-hydrochlorothiazide (Hyzaar) 100-12.5 mg tablet 1 tablet, oral, Daily    melatonin 3 mg tablet Take 1 tablet (3 mg) by mouth as needed at bedtime for sleep.    metFORMIN  mg 24 hr tablet 1 tablet (500 mg) 2 times daily (morning and late afternoon).    methocarbamol (ROBAXIN) 750 mg, oral, 3 times daily    moisturizing mouth (Biotene Moisturizing Mouth) solution USE AS DIRECTED AS A SALIVA SUBSTITUTE    potassium chloride CR 10 mEq ER  tablet 10 mEq, oral, Daily    Rybelsus 7 mg, oral, Daily    simvastatin (ZOCOR) 40 mg, oral, Daily    triamcinolone (Nasacort) 55 mcg nasal inhaler 2 sprays, Each Nostril, Daily      Vitals  BP Readings from Last 2 Encounters:   03/24/25 129/80   02/18/25 102/67     BMI Readings from Last 1 Encounters:   03/24/25 32.61 kg/m²      Labs  A1C  Lab Results   Component Value Date    HGBA1C 6.8 (A) 03/24/2025    HGBA1C 6.8 (A) 12/23/2024    HGBA1C 7.1 (A) 06/18/2024     BMP  Lab Results   Component Value Date    CALCIUM 9.9 06/18/2024     06/18/2024    K 3.8 06/18/2024    CO2 23 06/18/2024     (H) 06/18/2024    BUN 20 06/18/2024    CREATININE 0.73 06/18/2024    EGFR >90 06/18/2024     LFTs  Lab Results   Component Value Date    ALT 21 06/18/2024    AST 19 06/18/2024    ALKPHOS 53 06/18/2024    BILITOT 0.8 06/18/2024     FLP  Lab Results   Component Value Date    TRIG 98 06/18/2024    CHOL 163 06/18/2024    LDLF 81 08/17/2023    LDLCALC 104 (H) 06/18/2024    HDL 39.5 06/18/2024     Urine Microalbumin  Lab Results   Component Value Date    MICROALBCREA 6.9 08/17/2023     Weight Management  Wt Readings from Last 3 Encounters:   03/24/25 86.2 kg (190 lb)   02/18/25 85.7 kg (189 lb)   01/31/25 82.1 kg (181 lb)      There is no height or weight on file to calculate BMI.     Assessment/Plan   Problem List Items Addressed This Visit       Diabetes mellitus without complication     Patient's goal A1c is < 7.0%. Is pt at goal? Yes, most recent A1c on 12/23/24 of 6.8%. Prior A1c was 7.1% on 6/18/24.      Rationale for plan: Patient checks BG twice daily with One Touch glucometer. Patient's SMBGs are at goal with readings consistently between 110-120 mg/dL. Patient denies any low BG events. Current DM regimen includes Metformin  mg BID and Rybelsus 7 mg once daily. Patient denies any side effects or concerns with current medications. Given that BG is stable at this time, plan to continue current regimen. Encouraged  patient to continue diet and lifestyle modifications. Plan to follow up in 3 month to review BG readings and determine need for medication adjustments.     Medication Changes: None   CONTINUE:  Metformin  mg BID    Rybelsus 7 mg once daily     Monitoring and Education:  Counseled patient on Rybelsus MOA, expectations, side effects, duration of therapy, administration, and monitoring parameters.   Discussed administering greater than 30 minutes before first food, beverage, or other medications in the morning.   Reviewed Rybelsus titration schedule, starting with 3mg once daily for 30 days, then increase to 7 mg once daily; may increase to 14 mg once daily after 30 days on the 7 mg dose if needed to achieve glycemic goals.  If there is a missed dose, then this dose should be skipped; resume at the next scheduled dose.  Counseled patient on the benefits of GLP-1ra, such as cardiovascular risk reduction, glycemic control, and weight loss potential.  Advised patient that they may experience improved satiety after meals and portion sizes of meals may be reduced as doses of Rybelsus increase.  Counseled patient to avoid foods that are fatty/oily as this may precipitate the nausea/GI upset that may occur with new start Rybelsus    Clinical Pharmacist follow-up: 7/8/25 @ 4 pm, Telehealth visit    Continue all meds under the continuation of care with the referring provider and clinical pharmacy team.    Thank you,  Minerva De La O, PharmD  Clinical Pharmacist  [phone]    Verbal consent to manage patient's drug therapy was obtained from the patient. They were informed they may decline to participate or withdraw from participation in pharmacy services at any time.

## 2025-04-08 ENCOUNTER — APPOINTMENT (OUTPATIENT)
Dept: PHARMACY | Facility: HOSPITAL | Age: 65
End: 2025-04-08
Payer: COMMERCIAL

## 2025-04-08 ENCOUNTER — TELEMEDICINE (OUTPATIENT)
Dept: PHARMACY | Facility: HOSPITAL | Age: 65
End: 2025-04-08
Payer: COMMERCIAL

## 2025-04-08 DIAGNOSIS — E11.9 DIABETES MELLITUS WITHOUT COMPLICATION: ICD-10-CM

## 2025-04-10 ENCOUNTER — PHARMACY VISIT (OUTPATIENT)
Dept: PHARMACY | Facility: CLINIC | Age: 65
End: 2025-04-10
Payer: COMMERCIAL

## 2025-04-10 PROCEDURE — RXMED WILLOW AMBULATORY MEDICATION CHARGE

## 2025-04-11 DIAGNOSIS — J30.9 ALLERGIC RHINITIS, UNSPECIFIED SEASONALITY, UNSPECIFIED TRIGGER: ICD-10-CM

## 2025-04-11 RX ORDER — MINERAL OIL
180 ENEMA (ML) RECTAL DAILY PRN
Qty: 90 TABLET | Refills: 0 | Status: SHIPPED | OUTPATIENT
Start: 2025-04-11

## 2025-04-15 ENCOUNTER — TREATMENT (OUTPATIENT)
Dept: PHYSICAL THERAPY | Facility: CLINIC | Age: 65
End: 2025-04-15
Payer: COMMERCIAL

## 2025-04-15 DIAGNOSIS — M54.16 LUMBAR RADICULOPATHY: ICD-10-CM

## 2025-04-15 PROCEDURE — 97110 THERAPEUTIC EXERCISES: CPT | Mod: GP,CQ

## 2025-04-15 ASSESSMENT — PAIN - FUNCTIONAL ASSESSMENT: PAIN_FUNCTIONAL_ASSESSMENT: 0-10

## 2025-04-15 NOTE — PROGRESS NOTES
Physical Therapy    Physical Therapy Treatment    Patient Name: Heaven Alfaro  MRN: [id number]  Today's Date: 4/15/2025  Time Entry:   Time Calculation  Start Time: 0410  Stop Time: 0450  Time Calculation (min): 40 min     PT Therapeutic Procedures Time Entry  Therapeutic Exercise Time Entry: 40                 Visit: 3  Insurance: Reviewed  Physician: Isabelle Campbell    Assessment:  PT Assessment  Evaluation/Treatment Tolerance: Patient tolerated treatment well  Improved response to flexion therex today. Patient had reduction of symptoms in right foot during DKTC. Reports no current radicular symptoms down right leg at end of session. Review of HEP and printed out flexion exercises.     Plan:   Continue with POC  Scifit stepper, back flexion exercises, PT sequence/stabilization, posture/body mechanics, HP    OP PT Plan  PT Plan: Skilled PT  PT Frequency: 1 time per week  Duration: 6  Onset Date: 12/23/24  Rehab Potential: Good  Plan of Care Agreement: Patient    Current Problem  1. Lumbar radiculopathy  Follow Up In Physical Therapy        General        Subjective    No major changes with my back symptoms, its about the same  I continued to get cramps in my right calf  At the moment, no pain shooting into my leg, but it does happen on and off  No pain isolated to my back  Mostly stiff  I try to do my HEP three times per weak    Pain  Pain Assessment  Pain Assessment: 0-10    Objective     Treatments:  Therapeutic Exercise  Therapeutic Exercise Performed: Yes  HL piriformis stretch 2 x 30 sec  HL groin/er stretch 2 x 30 sec  DKTC 10 x 10 sec  SKTC on theraball with strap x 20  Seated hamstring stretch 2 x 30 sec  Forward flexion on theraball x 15  Review of HEP/flexion     OP EDUCATION:       Goals:  Active       PT Problem       PT Goal 1       Start:  03/05/25    Expected End:  06/03/25       Decrease back and leg pain to 4/10         PT Goal 2       Start:  03/05/25    Expected End:  06/03/25       Improve LEFS  by 20%         PT Goal 3       Start:  03/05/25    Expected End:  06/03/25       Maximize lumbar ROM         PT Goal 4       Start:  03/05/25    Expected End:  06/03/25       Good abdominal and back extensor strength

## 2025-04-22 ENCOUNTER — TREATMENT (OUTPATIENT)
Dept: PHYSICAL THERAPY | Facility: CLINIC | Age: 65
End: 2025-04-22
Payer: COMMERCIAL

## 2025-04-22 DIAGNOSIS — M54.16 LUMBAR RADICULOPATHY: ICD-10-CM

## 2025-04-22 PROCEDURE — 97110 THERAPEUTIC EXERCISES: CPT | Mod: GP,CQ

## 2025-04-22 ASSESSMENT — PAIN SCALES - GENERAL: PAINLEVEL_OUTOF10: 6

## 2025-04-22 ASSESSMENT — PAIN - FUNCTIONAL ASSESSMENT: PAIN_FUNCTIONAL_ASSESSMENT: 0-10

## 2025-04-22 NOTE — PROGRESS NOTES
Physical Therapy    Physical Therapy Treatment    Patient Name: Heaven Alfaro  MRN: [id number]  Today's Date: 4/22/2025  Time Entry:   Time Calculation  Start Time: 0430  Stop Time: 0515  Time Calculation (min): 45 min     PT Therapeutic Procedures Time Entry  Therapeutic Exercise Time Entry: 45                 Visit: 4  Insurance: Reviewed  Physician: Isabelle Campbell    Assessment:  PT Assessment  Evaluation/Treatment Tolerance: Patient tolerated treatment well  Patient has made progress abolishing radicular symptoms down into her leg. Her pain has remained isolated to the lower back, although the level of pain remains around a 6/10 with no reported changes in reduction. Today's therex was tolerated well. Shows fair carryover of therex.     Plan:   Continue with POC  Scifit stepper, back flexion exercises, PT sequence/stabilization, posture/body mechanics, HP    OP PT Plan  PT Plan: Skilled PT  PT Frequency: 1 time per week  Duration: 6  Onset Date: 12/23/24  Rehab Potential: Good  Plan of Care Agreement: Patient    Current Problem  1. Lumbar radiculopathy  Follow Up In Physical Therapy        General        Subjective    No radicular symptoms currently or that I can think of over this past weekend, pain has been isolated to my back  Currently isolated in the center of the back at a 6/10, work has been busy  HEP not completed for the past few days, I was busy from the holiday    Pain  Pain Assessment  Pain Assessment: 0-10  0-10 (Numeric) Pain Score: 6    Objective     Treatments:  Therapeutic Exercise  Therapeutic Exercise Performed: Yes  Stepper x 5 min  MHP under lower back x 8 min  DKTC from theraball x 20  DKTC 10 x 10 sec  Hip adduction with TA hold 15 x 5 sec  HL piriformis stretch 2 x 30 sec  RTB trunk rotations with TA engagement x 15  RTB rowing with TA engagement x 15  Pink theraball rotations x 15  Seated hamstring stretch 2 x 30 sec  Forward flexion on theraball x 15    OP EDUCATION:        Goals:  Active       PT Problem       PT Goal 1       Start:  03/05/25    Expected End:  06/03/25       Decrease back and leg pain to 4/10         PT Goal 2       Start:  03/05/25    Expected End:  06/03/25       Improve LEFS by 20%         PT Goal 3       Start:  03/05/25    Expected End:  06/03/25       Maximize lumbar ROM         PT Goal 4       Start:  03/05/25    Expected End:  06/03/25       Good abdominal and back extensor strength

## 2025-04-28 PROCEDURE — RXMED WILLOW AMBULATORY MEDICATION CHARGE

## 2025-04-29 ENCOUNTER — TREATMENT (OUTPATIENT)
Dept: PHYSICAL THERAPY | Facility: CLINIC | Age: 65
End: 2025-04-29
Payer: COMMERCIAL

## 2025-04-29 DIAGNOSIS — M54.16 LUMBAR RADICULOPATHY: ICD-10-CM

## 2025-04-29 PROCEDURE — 97110 THERAPEUTIC EXERCISES: CPT | Mod: GP | Performed by: PHYSICAL THERAPIST

## 2025-04-29 NOTE — PROGRESS NOTES
Physical Therapy    Physical Therapy Treatment    Patient Name: Heaven Alfaro  MRN: [id number]  Today's Date: 4/29/2025  Time Entry:   Time Calculation  Start Time: 0415  Stop Time: 0500  Time Calculation (min): 45 min  PT Therapeutic Procedures Time Entry  Therapeutic Exercise Time Entry: 40  Visit: 5  Insurance: Reviewed  Physician: Isabelle Campbell    Assessment: Patient with recent flare up of left sided back pain.  Patient with improvement with HP, muscle relaxer and HP.   No recent complaints of radicular pain into the calves.        Plan:  Reassess next visit   Continue with POC  Scifit stepper, back flexion exercises, PT sequence/stabilization, posture/body mechanics, HP  OP PT Plan  PT Plan: Skilled PT  PT Frequency: 1 time per week  Duration: 6  Onset Date: 12/23/24  Rehab Potential: Good  Plan of Care Agreement: Patient    Current Problem  1. Lumbar radiculopathy  Follow Up In Physical Therapy        General        Subjective    Left back and hip pain 9/10 - took muscle relaxer,HP, rest with some relief   No pain into calves recently   Avoiding lifting with function - being careful      Pain       Objective     Treatments:     Stepper x 10 min  Step stretch 20X  DKTC from theraball x 20  DKTC 10 x 10 sec  PT 20X   PT with leg lift 5X   Hip adduction with TA hold 15 x 5 sec  Small ROM HLR 20X   RTB trunk rotations with TA engagement x 15  RTB rowing with TA engagement x 15  Pink theraball rotations x 15  (40 minutes)    OP EDUCATION:       Goals:  Active       PT Problem       PT Goal 1       Start:  03/05/25    Expected End:  06/03/25       Decrease back and leg pain to 4/10         PT Goal 2       Start:  03/05/25    Expected End:  06/03/25       Improve LEFS by 20%         PT Goal 3       Start:  03/05/25    Expected End:  06/03/25       Maximize lumbar ROM         PT Goal 4       Start:  03/05/25    Expected End:  06/03/25       Good abdominal and back extensor strength

## 2025-04-30 ENCOUNTER — PHARMACY VISIT (OUTPATIENT)
Dept: PHARMACY | Facility: CLINIC | Age: 65
End: 2025-04-30
Payer: COMMERCIAL

## 2025-05-01 ASSESSMENT — EXTERNAL EXAM - RIGHT EYE: OD_EXAM: NORMAL

## 2025-05-01 ASSESSMENT — EXTERNAL EXAM - LEFT EYE: OS_EXAM: NORMAL

## 2025-05-01 ASSESSMENT — CUP TO DISC RATIO
OD_RATIO: .2
OS_RATIO: .2

## 2025-05-01 ASSESSMENT — SLIT LAMP EXAM - LIDS
COMMENTS: GOOD POSITION
COMMENTS: GOOD POSITION

## 2025-05-02 NOTE — PROGRESS NOTES
Myopia with astigmatism and [id number]  -New Rx given per patient request, optional to fill.  New Rx for glasses given per patient request. Patient's signature obtained to acknowledge and confirm that a paper copy of glasses Rx was given to patient in compliance with [facility] Eyeglass Rule. Electronic copy of Rx will also be available via [employer].       Diabetes [id number]  -OCT macula (6/5/17) - SS: 5/10 OD and 6/10 OS. Normal thickness and contour OU. 226/232.   -HbA1c= 6.8 (3/24/25). No diabetic retinopathy seen on exam. Continue close monitoring of blood glucose, blood pressure, and cholesterol. Plan to repeat dilated exam annually.     Dry eyes, [id number]  -Seasonal allergies, spring and fall. Not much itching at this time.   -May use warm compresses daily  -May continue artificial tears 2-4 times a day  -Uses Flonase  -May call for antihistamine drop recommendation as needed     Combined form of age-related cataract, left eyeH25.812  -Not visually significant at this time. Monitor. F/u 1 year for comprehensive examination with refraction, glare/BAT, dilation. Plan to repeat Lenstar/Pentacam/OCT macula when ready for cataract surgery OS.     Pseudophakia of right eyeZ96.1 - 1/17/19 - Complex with Trypan Blue  -Doing well. Good vision. IOP good.     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 Retinal detachment symptoms discussed.     Family history of glaucoma in alxjspS73.511  -OCT RNFL (6/5/17) - Signal strength 4/10 OD and 5/10 OS. WNL OU. 83/86.  -IOP okay and optic nerves appear healthy. Monitor annually.       No history of refractive surgery.   No FH of AMD

## 2025-05-05 ENCOUNTER — APPOINTMENT (OUTPATIENT)
Dept: OPHTHALMOLOGY | Facility: CLINIC | Age: 65
End: 2025-05-05
Payer: MEDICAID

## 2025-05-05 DIAGNOSIS — H52.13 MYOPIA OF BOTH EYES: Primary | ICD-10-CM

## 2025-05-05 DIAGNOSIS — H52.4 PRESBYOPIA: ICD-10-CM

## 2025-05-05 DIAGNOSIS — H25.812 COMBINED FORM OF AGE-RELATED CATARACT, LEFT EYE: ICD-10-CM

## 2025-05-05 DIAGNOSIS — H52.203 ASTIGMATISM OF BOTH EYES, UNSPECIFIED TYPE: ICD-10-CM

## 2025-05-05 DIAGNOSIS — H35.411 LATTICE DEGENERATION OF PERIPHERAL RETINA, RIGHT: ICD-10-CM

## 2025-05-05 DIAGNOSIS — H04.123 DRY EYES, BILATERAL: ICD-10-CM

## 2025-05-05 DIAGNOSIS — Z83.511 FAMILY HISTORY OF GLAUCOMA: ICD-10-CM

## 2025-05-05 DIAGNOSIS — Z96.1 PSEUDOPHAKIA: ICD-10-CM

## 2025-05-05 DIAGNOSIS — E11.9 DIABETES MELLITUS WITHOUT COMPLICATION: ICD-10-CM

## 2025-05-05 PROCEDURE — RXMED WILLOW AMBULATORY MEDICATION CHARGE

## 2025-05-05 PROCEDURE — 92015 DETERMINE REFRACTIVE STATE: CPT | Performed by: OPHTHALMOLOGY

## 2025-05-05 PROCEDURE — 92014 COMPRE OPH EXAM EST PT 1/>: CPT | Performed by: OPHTHALMOLOGY

## 2025-05-05 ASSESSMENT — ENCOUNTER SYMPTOMS
RESPIRATORY NEGATIVE: 0
CONSTITUTIONAL NEGATIVE: 0
CARDIOVASCULAR NEGATIVE: 0
MUSCULOSKELETAL NEGATIVE: 0
ALLERGIC/IMMUNOLOGIC NEGATIVE: 0
PSYCHIATRIC NEGATIVE: 0
ENDOCRINE NEGATIVE: 0
EYES NEGATIVE: 1
NEUROLOGICAL NEGATIVE: 0
GASTROINTESTINAL NEGATIVE: 0
HEMATOLOGIC/LYMPHATIC NEGATIVE: 0

## 2025-05-05 ASSESSMENT — VISUAL ACUITY
CORRECTION_TYPE: GLASSES
OS_BAT_MED: 20/80
OD_BAT_MED: 20/60
OS_CC: 20/20-2
METHOD: SNELLEN - LINEAR
OD_CC: 20/20

## 2025-05-05 ASSESSMENT — TONOMETRY
IOP_METHOD: GOLDMANN APPLANATION
OD_IOP_MMHG: 13
OS_IOP_MMHG: 13

## 2025-05-05 ASSESSMENT — CONF VISUAL FIELD
OS_NORMAL: 1
OS_INFERIOR_NASAL_RESTRICTION: 0
OD_SUPERIOR_NASAL_RESTRICTION: 0
OD_SUPERIOR_TEMPORAL_RESTRICTION: 0
OD_INFERIOR_TEMPORAL_RESTRICTION: 0
OS_SUPERIOR_TEMPORAL_RESTRICTION: 0
OD_INFERIOR_NASAL_RESTRICTION: 0
OS_INFERIOR_TEMPORAL_RESTRICTION: 0
OD_NORMAL: 1
OS_SUPERIOR_NASAL_RESTRICTION: 0

## 2025-05-05 ASSESSMENT — REFRACTION_WEARINGRX
OS_AXIS: 025
OS_CYLINDER: -2.25
OS_SPHERE: -4.50
OD_ADD: +2.50
OS_ADD: +2.50
OD_SPHERE: -2.50
OD_AXIS: 130
OD_CYLINDER: -0.50

## 2025-05-05 ASSESSMENT — REFRACTION_MANIFEST
OS_AXIS: 025
OD_AXIS: 130
OS_SPHERE: -4.00
OD_SPHERE: -2.25
OS_CYLINDER: -1.75
OS_ADD: +2.50
OD_ADD: +2.50
OD_CYLINDER: -0.75

## 2025-05-06 ENCOUNTER — PHARMACY VISIT (OUTPATIENT)
Dept: PHARMACY | Facility: CLINIC | Age: 65
End: 2025-05-06
Payer: COMMERCIAL

## 2025-05-06 ENCOUNTER — TREATMENT (OUTPATIENT)
Dept: PHYSICAL THERAPY | Facility: CLINIC | Age: 65
End: 2025-05-06
Payer: COMMERCIAL

## 2025-05-06 DIAGNOSIS — M54.16 LUMBAR RADICULOPATHY: ICD-10-CM

## 2025-05-06 PROCEDURE — 97110 THERAPEUTIC EXERCISES: CPT | Mod: GP | Performed by: PHYSICAL THERAPIST

## 2025-05-06 NOTE — PROGRESS NOTES
Physical Therapy    Physical Therapy Treatment    Patient Name: Heaven Alfaro  MRN: [id number]  Today's Date: 5/6/2025  Time Entry:   Time Calculation  Start Time: 0415  Stop Time: 0455  Time Calculation (min): 40 min  PT Therapeutic Procedures Time Entry  Therapeutic Exercise Time Entry: 40  Visit: 6  Insurance: Reviewed  Physician: Isabelle Campbell    Assessment: Patient seen in PT for 6 visits for lumbar radiculopathy.  Patient with improved radicular symptoms with only occasional calf cramping and slight decrease in back pain.   Patient with improved lumbar ext  ROM.   Patient with no change in abdominal strength to fair.  Discharge patient to [facility].  Patient advised to follow up with pain management due to continued high pain levels.        Plan:  Discharge to [facility]  Scifit stepper, back flexion exercises, PT sequence/stabilization, posture/body mechanics, HP  OP PT Plan  PT Plan: Skilled PT  PT Frequency: 1 time per week  Duration: 6  Onset Date: 12/23/24  Rehab Potential: Good  Plan of Care Agreement: Patient    Current Problem  1. Lumbar radiculopathy  Follow Up In Physical Therapy        General        Subjective    Back pain 6-7/10 at worse last 2 days - feels like a cramp - with work activities  Occasional pain down into right calf - feels like a cramp - worse in the pm and up on feet at work  Avoiding heavy lifting and strenuous ADL's    Pain       Objective     Lumbar ROM flexion 50 ext 10 bilateral SB 10  Fair abdominal strength  LEFS =58.75%    Treatments:     Stepper x 10 min  Step stretch 20X  DKTC from theraball x 20  DKTC 10 x 10 sec  PT on theraball 20X   PT with leg lift 5X   Hip adduction with TA hold 15 x 5 sec  Small ROM HLR 20X   RTB trunk rotations with TA engagement x 15  RTB rowing with TA engagement x 15  Green theraball rotations x 20  (40 minutes)    OP EDUCATION:       Goals:  Active       PT Problem       PT Goal 1       Start:  03/05/25    Expected End:  06/03/25       Decrease back and  leg pain to 4/10         PT Goal 2       Start:  03/05/25    Expected End:  06/03/25       Improve LEFS by 20%         PT Goal 3       Start:  03/05/25    Expected End:  06/03/25       Maximize lumbar ROM         PT Goal 4       Start:  03/05/25    Expected End:  06/03/25       Good abdominal and back extensor strength

## 2025-06-05 ENCOUNTER — HOSPITAL ENCOUNTER (OUTPATIENT)
Dept: RADIOLOGY | Facility: CLINIC | Age: 65
Discharge: HOME | End: 2025-06-05
Payer: COMMERCIAL

## 2025-06-05 DIAGNOSIS — E87.6 LOW BLOOD POTASSIUM: ICD-10-CM

## 2025-06-05 DIAGNOSIS — E11.9 CONTROLLED TYPE 2 DIABETES MELLITUS WITHOUT COMPLICATION, UNSPECIFIED WHETHER LONG TERM INSULIN USE: ICD-10-CM

## 2025-06-05 DIAGNOSIS — J30.9 ALLERGIC RHINITIS, UNSPECIFIED SEASONALITY, UNSPECIFIED TRIGGER: ICD-10-CM

## 2025-06-05 DIAGNOSIS — E78.00 PURE HYPERCHOLESTEROLEMIA: ICD-10-CM

## 2025-06-05 DIAGNOSIS — E11.9 CONTROLLED TYPE 2 DIABETES MELLITUS WITHOUT COMPLICATION, WITHOUT LONG-TERM CURRENT USE OF INSULIN: ICD-10-CM

## 2025-06-05 DIAGNOSIS — I10 PRIMARY HYPERTENSION: ICD-10-CM

## 2025-06-05 PROCEDURE — 75571 CT HRT W/O DYE W/CA TEST: CPT

## 2025-06-24 ENCOUNTER — APPOINTMENT (OUTPATIENT)
Dept: PRIMARY CARE | Facility: CLINIC | Age: 65
End: 2025-06-24
Payer: COMMERCIAL

## 2025-06-24 VITALS
SYSTOLIC BLOOD PRESSURE: 132 MMHG | WEIGHT: 185 LBS | BODY MASS INDEX: 31.58 KG/M2 | DIASTOLIC BLOOD PRESSURE: 80 MMHG | HEART RATE: 95 BPM | HEIGHT: 64 IN

## 2025-06-24 DIAGNOSIS — Z13.1 SCREENING FOR DIABETES MELLITUS: ICD-10-CM

## 2025-06-24 DIAGNOSIS — I10 PRIMARY HYPERTENSION: ICD-10-CM

## 2025-06-24 DIAGNOSIS — E11.9 CONTROLLED TYPE 2 DIABETES MELLITUS WITHOUT COMPLICATION, WITHOUT LONG-TERM CURRENT USE OF INSULIN: ICD-10-CM

## 2025-06-24 DIAGNOSIS — E11.9 DIABETES MELLITUS WITHOUT COMPLICATION: Primary | ICD-10-CM

## 2025-06-24 DIAGNOSIS — Z00.00 ROUTINE GENERAL MEDICAL EXAMINATION AT A HEALTH CARE FACILITY: ICD-10-CM

## 2025-06-24 DIAGNOSIS — Z13.220 SCREENING FOR LIPID DISORDERS: ICD-10-CM

## 2025-06-24 DIAGNOSIS — E11.9 CONTROLLED TYPE 2 DIABETES MELLITUS WITHOUT COMPLICATION, UNSPECIFIED WHETHER LONG TERM INSULIN USE: ICD-10-CM

## 2025-06-24 DIAGNOSIS — E78.00 PURE HYPERCHOLESTEROLEMIA: ICD-10-CM

## 2025-06-24 DIAGNOSIS — Z13.29 SCREENING FOR THYROID DISORDER: ICD-10-CM

## 2025-06-24 LAB — POC HEMOGLOBIN A1C: 6.3 % (ref 4.2–6.5)

## 2025-06-24 PROCEDURE — RXMED WILLOW AMBULATORY MEDICATION CHARGE

## 2025-06-24 RX ORDER — BLOOD SUGAR DIAGNOSTIC
STRIP MISCELLANEOUS
Qty: 100 STRIP | Refills: 1 | Status: SHIPPED | OUTPATIENT
Start: 2025-06-24

## 2025-06-24 RX ORDER — METFORMIN HYDROCHLORIDE 500 MG/1
TABLET, EXTENDED RELEASE ORAL
Qty: 180 TABLET | Refills: 1 | Status: SHIPPED | OUTPATIENT
Start: 2025-06-24

## 2025-06-24 ASSESSMENT — ENCOUNTER SYMPTOMS
DEPRESSION: 0
LOSS OF SENSATION IN FEET: 0
OCCASIONAL FEELINGS OF UNSTEADINESS: 0

## 2025-06-25 ENCOUNTER — PHARMACY VISIT (OUTPATIENT)
Dept: PHARMACY | Facility: CLINIC | Age: 65
End: 2025-06-25
Payer: COMMERCIAL

## 2025-06-25 PROCEDURE — RXMED WILLOW AMBULATORY MEDICATION CHARGE

## 2025-06-26 LAB
ALBUMIN SERPL-MCNC: 4.2 G/DL (ref 3.6–5.1)
ALBUMIN/CREAT UR: 3 MG/G CREAT
ALP SERPL-CCNC: 53 U/L (ref 37–153)
ALT SERPL-CCNC: 27 U/L (ref 6–29)
ANION GAP SERPL CALCULATED.4IONS-SCNC: 9 MMOL/L (CALC) (ref 7–17)
AST SERPL-CCNC: 24 U/L (ref 10–35)
BILIRUB SERPL-MCNC: 0.6 MG/DL (ref 0.2–1.2)
BUN SERPL-MCNC: 21 MG/DL (ref 7–25)
CALCIUM SERPL-MCNC: 9.6 MG/DL (ref 8.6–10.4)
CHLORIDE SERPL-SCNC: 107 MMOL/L (ref 98–110)
CHOLEST SERPL-MCNC: 123 MG/DL
CHOLEST/HDLC SERPL: 3.3 (CALC)
CO2 SERPL-SCNC: 27 MMOL/L (ref 20–32)
CREAT SERPL-MCNC: 0.77 MG/DL (ref 0.5–1.05)
CREAT UR-MCNC: 114 MG/DL (ref 20–275)
EGFRCR SERPLBLD CKD-EPI 2021: 87 ML/MIN/1.73M2
ERYTHROCYTE [DISTWIDTH] IN BLOOD BY AUTOMATED COUNT: 12.8 % (ref 11–15)
GLUCOSE SERPL-MCNC: 105 MG/DL (ref 65–139)
HCT VFR BLD AUTO: 38.4 % (ref 35–45)
HDLC SERPL-MCNC: 37 MG/DL
HGB BLD-MCNC: 12.3 G/DL (ref 11.7–15.5)
LDLC SERPL CALC-MCNC: 66 MG/DL (CALC)
MCH RBC QN AUTO: 28.4 PG (ref 27–33)
MCHC RBC AUTO-ENTMCNC: 32 G/DL (ref 32–36)
MCV RBC AUTO: 88.7 FL (ref 80–100)
MICROALBUMIN UR-MCNC: 0.3 MG/DL
NONHDLC SERPL-MCNC: 86 MG/DL (CALC)
PLATELET # BLD AUTO: 277 THOUSAND/UL (ref 140–400)
PMV BLD REES-ECKER: 10.2 FL (ref 7.5–12.5)
POTASSIUM SERPL-SCNC: 3.8 MMOL/L (ref 3.5–5.3)
PROT SERPL-MCNC: 6.6 G/DL (ref 6.1–8.1)
RBC # BLD AUTO: 4.33 MILLION/UL (ref 3.8–5.1)
SODIUM SERPL-SCNC: 143 MMOL/L (ref 135–146)
TRIGL SERPL-MCNC: 113 MG/DL
TSH SERPL-ACNC: 0.74 MIU/L (ref 0.4–4.5)
WBC # BLD AUTO: 5 THOUSAND/UL (ref 3.8–10.8)

## 2025-07-05 PROBLEM — Z13.29 SCREENING FOR THYROID DISORDER: Status: ACTIVE | Noted: 2025-07-05

## 2025-07-05 PROBLEM — Z13.1 SCREENING FOR DIABETES MELLITUS: Status: ACTIVE | Noted: 2025-07-05

## 2025-07-05 PROBLEM — Z13.220 SCREENING FOR LIPID DISORDERS: Status: ACTIVE | Noted: 2025-07-05

## 2025-07-05 ASSESSMENT — ENCOUNTER SYMPTOMS
NAUSEA: 0
RESPIRATORY NEGATIVE: 1
CARDIOVASCULAR NEGATIVE: 1
MYALGIAS: 1
VOMITING: 0
NUMBNESS: 1
PSYCHIATRIC NEGATIVE: 1
GASTROINTESTINAL NEGATIVE: 1
CHILLS: 0
HEMATOLOGIC/LYMPHATIC NEGATIVE: 1
ENDOCRINE NEGATIVE: 1
ALLERGIC/IMMUNOLOGIC NEGATIVE: 1
FEVER: 0
BACK PAIN: 1
HYPERTENSION: 1
ACTIVITY CHANGE: 1
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
## 2025-07-06 NOTE — ASSESSMENT & PLAN NOTE
BP Readings from Last 1 Encounters:   06/24/25 132/80     - doing well, asymptomatic  - discussed at length the impact of untreated MOLLY and BP control  - continue current management and follow-up

## 2025-07-08 ENCOUNTER — APPOINTMENT (OUTPATIENT)
Dept: PHARMACY | Facility: HOSPITAL | Age: 65
End: 2025-07-08
Payer: COMMERCIAL

## 2025-07-08 DIAGNOSIS — E11.9 DIABETES MELLITUS WITHOUT COMPLICATION: ICD-10-CM

## 2025-07-08 NOTE — PROGRESS NOTES
Clinical Pharmacy Appointment    Patient ID: Heaven Alfaro is a 65 y.o. female who presents for diabetes.     This is a Follow Up appointment.     Referring Provider: Vern Miranda MD  PCP: Vern Miranda MD   Last visit with PCP: 6/24/25  Next visit with PCP: 9/24/25    Subjective     DIABETES MELLITUS TYPE II:    Known diabetic complications: None.  Does patient follow with Endocrinology: No  Last optometry exam: 4/29/24; next visit 5/5/25   Most recent visit in Podiatry: Needs to schedule -- patient denies sores or cuts on feet today     Current diabetic medications include:  Rybelsus 7 mg daily   Metformin  mg BID     Adverse Effects: Nausea after taking that lasts about an hour    Past diabetic medications include:  Alogliptin - stopped due to transition to Rybelsus   Jardiance - yeast infection    Glucose Readings:  Glucometer/CGM Type: One Touch Ultra   Patient tests BG 2 times per day - FBG and evening glucose     Current home BG readings: Mostly 110-120 mg/dL     Any episodes of hypoglycemia? No, none reported.  Did patient treat episode of hypoglycemia appropriately? N/A  Does the patient have a prescription for ready-to-use Glucagon? Not on insulin    Lifestyle:  Diet: 2-3 meals/day. Tries to eat healthy. Has met with a nutritionist before and found the information helpful. Some reduction in appetite. Lost about   BK: multigrain cherrios, martínez or sausage with one egg (boiled or omelette)  LN: sometimes skips. Enjoys turkey sandwich with cheese.   DN: meatloaf with rice and stanley beans  Snacks: yogurt with fruit. Granola bar.  Drinks: water or green tea.   Physical Activity: Stays active at home. Walks around her neighborhood.     Secondary Prevention:  Statin? Yes - Simvastatin 40 mg daily   ACE-I/ARB? Yes - losartan-hydrochlorothiazide 100-12.5 mg daily   Aspirin? No     Pertinent PMH Review:  PMH of Pancreatitis: No  PMH of Retinopathy: Yes  PMH of Urinary Tract Infections: Yes  PMH of MTC:  No  PMH of Obesity: Yes  PMH of ASCVD: Risk factors   PMH of CKD: No   PMH of CHF: No     Medication Reconciliation:  No changes     Drug Interactions  No relevant drug interactions were noted.     Medication System Management  [facility]  Adherence/Organization: No issues   Affordability/Accessibility: $20 per month for Rybelsus.   Enrolled in [employer]    Objective   Allergies   Allergen Reactions    Chocolate Flavor Unknown    Oxycodone-Acetaminophen Other    Propoxyphene N-Acetaminophen Other     Social History     Social History Narrative    Not on file      Medication Review  Current Outpatient Medications   Medication Instructions    albuterol (Ventolin HFA) 90 mcg/actuation inhaler 2 puffs, inhalation, Every 4 hours PRN    blood sugar diagnostic (OneTouch Ultra Test) USE 1 STRIP DAILY TO TEST BLOOD SUGAR    carvedilol (COREG) 12.5 mg, oral, 2 times daily    cyanocobalamin (VITAMIN B-12) 1,000 mcg, Daily    estradiol (Estrace) 0.01 % (0.1 mg/gram) vaginal cream APPLY A PEA-SIZED AMOUNT TO urethral and vagina EVERY EVENING FOR TWO WEEKS, THEN DECREASE TO EVERY MONDAY, WEDNESDAY AND FRIDAY THEREAFTER    fexofenadine (ALLERGY RELIEF (FEXOFENADINE)) 180 mg, oral, Daily PRN    gabapentin (NEURONTIN) 600 mg, oral, 3 times daily    ibuprofen 600 mg tablet TAKE 1 TABLET BY MOUTH EVERY MORNING, EVERY EVENING AND EVERY NIGHT AT BEDTIME    losartan-hydrochlorothiazide (Hyzaar) 100-12.5 mg tablet 1 tablet, oral, Daily    melatonin 3 mg tablet Take 1 tablet (3 mg) by mouth as needed at bedtime for sleep.    metFORMIN  mg 24 hr tablet 1 tablet (500 mg) 2 times daily (morning and late afternoon).    methocarbamol (ROBAXIN) 750 mg, oral, 3 times daily    moisturizing mouth (Biotene Moisturizing Mouth) solution USE AS DIRECTED AS A SALIVA SUBSTITUTE    potassium chloride CR 10 mEq ER tablet 10 mEq, oral, Daily    Rybelsus 7 mg, oral, Daily    simvastatin (ZOCOR) 40 mg, oral, Daily     triamcinolone (Nasacort) 55 mcg nasal inhaler 2 sprays, Each Nostril, Daily      Vitals  BP Readings from Last 2 Encounters:   06/24/25 132/80   03/24/25 129/80     BMI Readings from Last 1 Encounters:   06/24/25 31.76 kg/m²      Labs  A1C  Lab Results   Component Value Date    HGBA1C 6.3 06/24/2025    HGBA1C 6.8 (A) 03/24/2025    HGBA1C 6.8 (A) 12/23/2024     BMP  Lab Results   Component Value Date    CALCIUM 9.6 06/25/2025     06/25/2025    K 3.8 06/25/2025    CO2 27 06/25/2025     06/25/2025    BUN 21 06/25/2025    CREATININE 0.77 06/25/2025    EGFR 87 06/25/2025     LFTs  Lab Results   Component Value Date    ALT 27 06/25/2025    AST 24 06/25/2025    ALKPHOS 53 06/25/2025    BILITOT 0.6 06/25/2025     FLP  Lab Results   Component Value Date    TRIG 113 06/25/2025    CHOL 123 06/25/2025    LDLF 81 08/17/2023    LDLCALC 66 06/25/2025    HDL 37 (L) 06/25/2025     Urine Microalbumin  Lab Results   Component Value Date    MICROALBCREA 3 06/25/2025     Weight Management  Wt Readings from Last 3 Encounters:   06/24/25 83.9 kg (185 lb)   03/24/25 86.2 kg (190 lb)   02/18/25 85.7 kg (189 lb)      There is no height or weight on file to calculate BMI.     Assessment/Plan   Problem List Items Addressed This Visit       Diabetes mellitus without complication    Relevant Orders    Referral to Clinical Pharmacy     Patient's goal A1c is < 7.0%. Is pt at goal? Yes, most recent A1c on 6/24/25 of 6.3%. Prior A1c was 6.8% on 3/24/25. Due for next A1c after 12/24/25.       Rationale for plan: Patient checks BG twice daily with One Touch glucometer. Patient's SMBGs are at goal with readings consistently between 110-120 mg/dL. Patient denies any low BG events. Current DM regimen includes Metformin  mg BID and Rybelsus 7 mg once daily. Patient denies any side effects or concerns with current medications. Given that BG is stable at this time, plan to continue current regimen. Encouraged patient to continue diet and  lifestyle modifications. Plan to follow up in 3 month to review BG readings and determine need for medication adjustments.     Medication Changes: None   CONTINUE:  Metformin  mg BID    Rybelsus 7 mg once daily     Monitoring and Education:  Counseled patient on Rybelsus MOA, expectations, side effects, duration of therapy, administration, and monitoring parameters.   Discussed administering greater than 30 minutes before first food, beverage, or other medications in the morning.   Reviewed Rybelsus titration schedule, starting with 3mg once daily for 30 days, then increase to 7 mg once daily; may increase to 14 mg once daily after 30 days on the 7 mg dose if needed to achieve glycemic goals.  If there is a missed dose, then this dose should be skipped; resume at the next scheduled dose.  Counseled patient on the benefits of GLP-1ra, such as cardiovascular risk reduction, glycemic control, and weight loss potential.  Advised patient that they may experience improved satiety after meals and portion sizes of meals may be reduced as doses of Rybelsus increase.  Counseled patient to avoid foods that are fatty/oily as this may precipitate the nausea/GI upset that may occur with new start Rybelsus    Clinical Pharmacist follow-up: 10/8/25 @ 4 pm, Telehealth visit    Continue all meds under the continuation of care with the referring provider and clinical pharmacy team.    Thank you,  Minerva De La O, PharmD  Clinical Pharmacist  [phone]    Verbal consent to manage patient's drug therapy was obtained from the patient. They were informed they may decline to participate or withdraw from participation in pharmacy services at any time.

## 2025-07-15 ENCOUNTER — APPOINTMENT (OUTPATIENT)
Dept: PAIN MEDICINE | Facility: HOSPITAL | Age: 65
End: 2025-07-15
Payer: COMMERCIAL

## 2025-07-18 ENCOUNTER — APPOINTMENT (OUTPATIENT)
Dept: SLEEP MEDICINE | Facility: CLINIC | Age: 65
End: 2025-07-18
Payer: COMMERCIAL

## 2025-07-18 DIAGNOSIS — I10 PRIMARY HYPERTENSION: ICD-10-CM

## 2025-07-18 DIAGNOSIS — I10 HYPERTENSION: ICD-10-CM

## 2025-07-18 PROCEDURE — RXMED WILLOW AMBULATORY MEDICATION CHARGE

## 2025-07-18 RX ORDER — CARVEDILOL 12.5 MG/1
12.5 TABLET ORAL 2 TIMES DAILY
Qty: 180 TABLET | Refills: 1 | Status: SHIPPED | OUTPATIENT
Start: 2025-07-18

## 2025-07-18 RX ORDER — LOSARTAN POTASSIUM AND HYDROCHLOROTHIAZIDE 12.5; 1 MG/1; MG/1
1 TABLET ORAL DAILY
Qty: 90 TABLET | Refills: 1 | Status: SHIPPED | OUTPATIENT
Start: 2025-07-18

## 2025-07-19 ENCOUNTER — PHARMACY VISIT (OUTPATIENT)
Dept: PHARMACY | Facility: CLINIC | Age: 65
End: 2025-07-19
Payer: COMMERCIAL

## 2025-07-22 ENCOUNTER — PHARMACY VISIT (OUTPATIENT)
Dept: PHARMACY | Facility: CLINIC | Age: 65
End: 2025-07-22
Payer: COMMERCIAL

## 2025-07-22 ENCOUNTER — OFFICE VISIT (OUTPATIENT)
Dept: PAIN MEDICINE | Facility: HOSPITAL | Age: 65
End: 2025-07-22
Payer: COMMERCIAL

## 2025-07-22 DIAGNOSIS — M54.16 LUMBAR RADICULOPATHY: ICD-10-CM

## 2025-07-22 DIAGNOSIS — M48.062 SPINAL STENOSIS OF LUMBAR REGION WITH NEUROGENIC CLAUDICATION: Primary | ICD-10-CM

## 2025-07-22 PROCEDURE — 99214 OFFICE O/P EST MOD 30 MIN: CPT | Performed by: ANESTHESIOLOGY

## 2025-07-22 PROCEDURE — 3044F HG A1C LEVEL LT 7.0%: CPT | Performed by: ANESTHESIOLOGY

## 2025-07-22 ASSESSMENT — PAIN SCALES - GENERAL: PAINLEVEL_OUTOF10: 8

## 2025-07-22 NOTE — PROGRESS NOTES
Subjective   Patient ID: Heaven Alfaro is a 65 y.o. female with a past medical history of HTN, HLD, T2DM, spondylolisthesis of lumbar spine     HPI:   Ms. Alfaro is a 64-year-old female who presents today for lower back pain S/P bilateral L4 transforaminal epidural steroid injection.the patient last was seen for epidural injections on 2/3/2025.  The patient states that she got 75% relief for 4 months following the aforementioned epidurals.  She notes that the pain has been gradually increasing since she finished her physical therapy,     She describes the pain as an aching lower back pain that occasionally radiates to the right thigh laterally however it is not as frequent as prior to her last injection.  The pain is aggravated by bending over and relieved by rest.  The pain is rated a 7 out of 10 in severity.    Patient is taking ibuprofen, takes gabapentin on bad days only.    Patient denies weakness, urinary incontinence, bowel incontinence and saddle anesthesia.      Physical Therapy: The patient has done six or more weeks of physical therapy in the past six months with minimal improvement  Other Conservative Measures she has tried: Heating Pad and Injections  Classes of medications tried in the past: Acetaminophen, NSAIDs, and Gabapentenoids      Review of Systems   13-point ROS done and negative except for HPI.     Current Outpatient Medications   Medication Instructions    albuterol (Ventolin HFA) 90 mcg/actuation inhaler 2 puffs, inhalation, Every 4 hours PRN    blood sugar diagnostic (OneTouch Ultra Test) USE 1 STRIP DAILY TO TEST BLOOD SUGAR    carvedilol (COREG) 12.5 mg, oral, 2 times daily    cyanocobalamin (VITAMIN B-12) 1,000 mcg, Daily    estradiol (Estrace) 0.01 % (0.1 mg/gram) vaginal cream APPLY A PEA-SIZED AMOUNT TO urethral and vagina EVERY EVENING FOR TWO WEEKS, THEN DECREASE TO EVERY MONDAY, WEDNESDAY AND FRIDAY THEREAFTER    fexofenadine (ALLERGY RELIEF (FEXOFENADINE)) 180 mg, oral, Daily PRN     gabapentin (NEURONTIN) 600 mg, oral, 3 times daily    ibuprofen 600 mg tablet TAKE 1 TABLET BY MOUTH EVERY MORNING, EVERY EVENING AND EVERY NIGHT AT BEDTIME    losartan-hydrochlorothiazide (Hyzaar) 100-12.5 mg tablet 1 tablet, oral, Daily    melatonin 3 mg tablet Take 1 tablet (3 mg) by mouth as needed at bedtime for sleep.    metFORMIN  mg 24 hr tablet 1 tablet (500 mg) 2 times daily (morning and late afternoon).    methocarbamol (ROBAXIN) 750 mg, oral, 3 times daily    moisturizing mouth (Biotene Moisturizing Mouth) solution USE AS DIRECTED AS A SALIVA SUBSTITUTE    potassium chloride CR 10 mEq ER tablet 10 mEq, oral, Daily    Rybelsus 7 mg, oral, Daily    simvastatin (ZOCOR) 40 mg, oral, Daily    triamcinolone (Nasacort) 55 mcg nasal inhaler 2 sprays, Each Nostril, Daily       Medical History[1]     Surgical History[2]     Family History[3]     RX Allergies[4]     Objective     There were no vitals filed for this visit.     Physical Exam  General: NAD, well groomed, well nourished  Eyes: Non-icteric sclera, EOMI  Ears, Nose, Mouth, and Throat: External ears and nose appear to be without deformity or rash. No lesions or masses noted. Hearing is grossly intact.   Neck: Trachea midline  Respiratory: Nonlabored breathing   Cardiovascular: no peripheral edema   Skin: No rashes or open lesions/ulcers identified on skin.    Back:   Palpation: No tenderness to palpation over lumbar paraspinous muscles.   Straight leg raise: positive at 45 degrees on the right     Psychiatric: Alert, orientation to person, place, and time. Cooperative.    Imaging personally reviewed and independently interpreted.  Narrative & Impression   Interpreted By:  Carito Goncalves,   STUDY:  MR LUMBAR SPINE W AND WO IV CONTRAST;  1/28/2025 5:16 pm      INDICATION:  Signs/Symptoms:acute apondylolesthe.      COMPARISON:  March 2, 2017      ACCESSION NUMBER(S):  [id number]      ORDERING CLINICIAN:  JOSE ANTONIO VIDALES      TECHNIQUE:  Sagittal T1,  T2, STIR, axial T1 and T2 weighted images of the lumbar  spine were acquired. Axial and sagittal postcontrast T1 weighted  images were obtained following administration of 18 mL of Dotarem.      FINDINGS:  Alignment: There is grade 2-3 anterolisthesis of L4 on L5 currently  measuring 1.2 cm compared with 0.9 cm on the previous exam.      Vertebrae/Intervertebral Discs: The vertebral bodies demonstrate  expected height.  There is increased signal on STIR imaging within  the L3, L4, and L5 pedicles and articulating processes which is  nonspecific but may be due to degenerative or stress related changes.  Loss of disc height and endplate spurring remain most severe at L4-5.  There are subtle, predominantly Modic type 2 degenerative endplate  signal changes at L2-3 and L4-5.      Conus: The lower thoracic cord appears unremarkable. The conus  terminates at L1.      T12-L1:  There is no significant central canal or neural foraminal  stenosis. L1-2:  There is no significant central canal or neural  foraminal stenosis. L2-3:  Minimal disc bulging and mild facet and  ligamentum flavum hypertrophy do not significantly narrow the central  canal or neuroforamina. L3-4: There is bilateral facet arthropathy  and ligamentum flavum hypertrophy as well as mild disc bulging  contributing to narrowing of the lateral recesses and subtle  flattening of the thecal sac. There is mild neuroforaminal  encroachment. There has been mild progression from the previous exam.  L4-5: Facet and ligamentum flavum hypertrophy are demonstrated. There  is circumferential disc bulging/pseudo disc bulging in addition to  the anterolisthesis contributing to effacement of the lateral  recesses, right greater than left. There is severe central canal  stenosis. There is severe right greater than left neuroforaminal  narrowing with mass effect on the exiting L4 nerve roots. There is a  superimposed intraforaminal and subarticular disc protrusion on  the  right. There has been interval progression from the previous exam.  L5-S1: There is right greater than left degenerative facet  arthropathy. There is no significant central canal or neural  foraminal stenosis.      There is no abnormal enhancement of the conus or nerve roots of the  cauda equina.      There is subtle edema within the L3 spinous process which is  nonspecific. There is also mild edema within the posterior  paraspinous musculature particularly from L3 through L5.      IMPRESSION:      1. There is now grade 2-3 anterolisthesis of L4 on L5. There is  effacement of the lateral recesses and severe central canal and  neuroforaminal stenosis with interval worsening from the previous  examination. There may now be a superimposed right subarticular and  intraforaminal disc protrusion.  2. Mild degenerative changes at L3-4 and L2-3.           Assessment/Plan   Heaven Alfaro is a 64 y.o. female with a past medical history of HTN, HLD, T2DM, spondylolisthesis of lumbar spine who presents to the clinic for worsening back pain.Given her pain and significant relief with previous steroid injections, we discussed repeating the epidural injection for her. She is agreeable to this plan at this time.    Plan:  -Repeat bilateral transforaminal L4 epidural steroid injection (possibly Depo).  Procedure, risk, benefits, alternatives reviewed.    Discussed with the patient that she does have rather advanced changes particularly at the L4-5 level.  The patient does feel like she is getting good relief that is lasting and would like to pursue ongoing conservative treatments rather than surgical intervention.  The patient does understand that at some point as symptoms and disease progresses, she may need surgical intervention.    Encouraged to keep up with physical therapy and core strengthening.    The patient has failed treatment with : Physical therapy , injections, and have significant impairments of their activities of  daily living (ADLs) due to the pain    We discussed  the risks, benefits and alternatives of the procedure including but not limited to: , Lack of efficacy , Transiently worsening pain , Bleeding, Infection , and Nerve Damage    Follow up: After procedure    The patient was invited to contact us back anytime with any questions or concerns and follow-up with us in the office as needed.     There are no diagnoses linked to this encounter.    The patient was given an opportunity to ask questions and were answered. The patient verbalized understanding and was agreeable to plan.    The patient was discussed and seen with Dr. Monroy.      Emeka Bagley MD  Anesthesiology PGY-3  [facility]           [1]   Past Medical History:  Diagnosis Date    Abnormal finding in urine 02/05/2023    Acute UTI 02/05/2023    Bacterial vaginosis 02/05/2023    Cataract     Diabetes mellitus (Multi)     Dry eyes     Electrolyte imbalance 06/27/2012    Lattice degeneration of peripheral retina, right     Other conditions influencing health status     Normal coronary arteries    Vaginal yeast infection 02/05/2023   [2]   Past Surgical History:  Procedure Laterality Date    CATARACT EXTRACTION W/  INTRAOCULAR LENS IMPLANT Right 01/17/2019    Dr. Gay with Trypan Blue    HYSTERECTOMY      OTHER SURGICAL HISTORY  05/04/2018    Radical Total Abdominal Hysterectomy   [3]   Family History  Problem Relation Name Age of Onset    Hypertension Mother      Glaucoma Mother      Breast cancer Mother      Breast cancer Mother's Sister     [4]   Allergies  Allergen Reactions    Chocolate Flavor Unknown    Oxycodone-Acetaminophen Other    Propoxyphene N-Acetaminophen Other

## 2025-08-05 ENCOUNTER — APPOINTMENT (OUTPATIENT)
Dept: PAIN MEDICINE | Facility: HOSPITAL | Age: 65
End: 2025-08-05
Payer: COMMERCIAL

## 2025-08-18 ENCOUNTER — APPOINTMENT (OUTPATIENT)
Dept: PODIATRY | Facility: CLINIC | Age: 65
End: 2025-08-18
Payer: COMMERCIAL

## 2025-08-18 DIAGNOSIS — E11.9 ENCOUNTER FOR DIABETIC FOOT EXAM (MULTI): Primary | ICD-10-CM

## 2025-08-18 DIAGNOSIS — M67.471 GANGLION CYST OF RIGHT FOOT: ICD-10-CM

## 2025-08-18 PROCEDURE — 1036F TOBACCO NON-USER: CPT | Performed by: PODIATRIST

## 2025-08-18 PROCEDURE — 3044F HG A1C LEVEL LT 7.0%: CPT | Performed by: PODIATRIST

## 2025-08-18 PROCEDURE — 99203 OFFICE O/P NEW LOW 30 MIN: CPT | Performed by: PODIATRIST

## 2025-09-24 ENCOUNTER — APPOINTMENT (OUTPATIENT)
Dept: PRIMARY CARE | Facility: CLINIC | Age: 65
End: 2025-09-24
Payer: COMMERCIAL

## 2025-10-08 ENCOUNTER — APPOINTMENT (OUTPATIENT)
Dept: PHARMACY | Facility: HOSPITAL | Age: 65
End: 2025-10-08
Payer: COMMERCIAL

## 2026-01-12 ENCOUNTER — APPOINTMENT (OUTPATIENT)
Dept: PODIATRY | Facility: CLINIC | Age: 66
End: 2026-01-12
Payer: COMMERCIAL

## 2026-01-26 ENCOUNTER — APPOINTMENT (OUTPATIENT)
Dept: SLEEP MEDICINE | Facility: CLINIC | Age: 66
End: 2026-01-26
Payer: COMMERCIAL

## 2026-05-11 ENCOUNTER — APPOINTMENT (OUTPATIENT)
Dept: OPHTHALMOLOGY | Facility: CLINIC | Age: 66
End: 2026-05-11
Payer: COMMERCIAL